# Patient Record
Sex: MALE | Race: OTHER | NOT HISPANIC OR LATINO | URBAN - METROPOLITAN AREA
[De-identification: names, ages, dates, MRNs, and addresses within clinical notes are randomized per-mention and may not be internally consistent; named-entity substitution may affect disease eponyms.]

---

## 2022-08-16 ENCOUNTER — EMERGENCY (EMERGENCY)
Facility: HOSPITAL | Age: 47
LOS: 1 days | Discharge: HOME | End: 2022-08-16
Admitting: STUDENT IN AN ORGANIZED HEALTH CARE EDUCATION/TRAINING PROGRAM
Payer: MEDICAID

## 2022-08-16 VITALS
HEIGHT: 66 IN | OXYGEN SATURATION: 98 % | SYSTOLIC BLOOD PRESSURE: 142 MMHG | RESPIRATION RATE: 18 BRPM | HEART RATE: 92 BPM | DIASTOLIC BLOOD PRESSURE: 91 MMHG | WEIGHT: 179.9 LBS | TEMPERATURE: 99 F

## 2022-08-16 DIAGNOSIS — K40.90 UNILATERAL INGUINAL HERNIA, WITHOUT OBSTRUCTION OR GANGRENE, NOT SPECIFIED AS RECURRENT: Chronic | ICD-10-CM

## 2022-08-16 PROCEDURE — 96372 THER/PROPH/DIAG INJ SC/IM: CPT

## 2022-08-16 PROCEDURE — 99284 EMERGENCY DEPT VISIT MOD MDM: CPT

## 2022-08-16 PROCEDURE — 99283 EMERGENCY DEPT VISIT LOW MDM: CPT | Mod: 25

## 2022-08-16 RX ORDER — CYCLOBENZAPRINE HYDROCHLORIDE 10 MG/1
1 TABLET, FILM COATED ORAL
Qty: 15 | Refills: 0
Start: 2022-08-16

## 2022-08-16 RX ORDER — CYCLOBENZAPRINE HYDROCHLORIDE 10 MG/1
10 TABLET, FILM COATED ORAL ONCE
Refills: 0 | Status: COMPLETED | OUTPATIENT
Start: 2022-08-16 | End: 2022-08-16

## 2022-08-16 RX ORDER — IBUPROFEN 200 MG
1 TABLET ORAL
Qty: 15 | Refills: 0
Start: 2022-08-16

## 2022-08-16 RX ORDER — LIDOCAINE 4 G/100G
1 CREAM TOPICAL ONCE
Refills: 0 | Status: COMPLETED | OUTPATIENT
Start: 2022-08-16 | End: 2022-08-16

## 2022-08-16 RX ORDER — KETOROLAC TROMETHAMINE 30 MG/ML
15 SYRINGE (ML) INJECTION ONCE
Refills: 0 | Status: DISCONTINUED | OUTPATIENT
Start: 2022-08-16 | End: 2022-08-16

## 2022-08-16 RX ADMIN — CYCLOBENZAPRINE HYDROCHLORIDE 10 MILLIGRAM(S): 10 TABLET, FILM COATED ORAL at 19:58

## 2022-08-16 RX ADMIN — Medication 15 MILLIGRAM(S): at 19:58

## 2022-08-16 RX ADMIN — LIDOCAINE 1 PATCH: 4 CREAM TOPICAL at 20:01

## 2022-08-16 NOTE — ED ADULT NURSE NOTE - SUICIDE SCREENING DEPRESSION
Continue same coumadin dose. recheck in 1 month
Patient wife and daughter informed and appt scheduled.
Negative

## 2022-08-16 NOTE — ED ADULT TRIAGE NOTE - ISOLATION TYPE:
Quality 111:Pneumonia Vaccination Status For Older Adults: Pneumococcal Vaccination not Administered or Previously Received, Reason not Otherwise Specified Detail Level: Generalized Quality 110: Preventive Care And Screening: Influenza Immunization: Influenza Immunization not Administered for Documented Reasons. Quality 431: Preventive Care And Screening: Unhealthy Alcohol Use - Screening: Patient screened for unhealthy alcohol use using a single question and scores less than 2 times per year Quality 130: Documentation Of Current Medications In The Medical Record: Current Medications Documented None

## 2022-08-16 NOTE — ED PROVIDER NOTE - CLINICAL SUMMARY MEDICAL DECISION MAKING FREE TEXT BOX
47 M denies pmh p/w L lower back pain radiating down LLE x 3 days, no trauma, no red flag sxs.  on exam pt vss, Back: no midline ttp/step off, + ttp over L lumbar/gluteal region, + L SLR, CHAVIRA and NVI.  consistent w/ sciatica pain.  given NSAID/msk relaxer and lido patch with improvement.  will dc w/ rxs and have f/u with pmd.  discussed strict return parameters

## 2022-08-16 NOTE — ED PROVIDER NOTE - NSFOLLOWUPINSTRUCTIONS_ED_ALL_ED_FT
Take tylenol 650mg or motrin 600mg for pain every 4-6 hours.  You can also take flexeril (muscle relaxer) as prescribed for pain but do not drive/operate heavy machinery as it can make you drowsy    Call to arrange follow up with primary care doctor within one week       Sciatica       Sciatica is pain, numbness, weakness, or tingling along the path of the sciatic nerve. The sciatic nerve starts in the lower back and runs down the back of each leg. The nerve controls the muscles in the lower leg and in the back of the knee. It also provides feeling (sensation) to the back of the thigh, the lower leg, and the sole of the foot. Sciatica is a symptom of another medical condition that pinches or puts pressure on the sciatic nerve.    Sciatica most often only affects one side of the body. Sciatica usually goes away on its own or with treatment. In some cases, sciatica may come back (recur).      What are the causes?    This condition is caused by pressure on the sciatic nerve or pinching of the nerve. This may be the result of:  •A disk in between the bones of the spine bulging out too far (herniated disk).      •Age-related changes in the spinal disks.      •A pain disorder that affects a muscle in the buttock.      •Extra bone growth near the sciatic nerve.      •A break (fracture) of the pelvis.      •Pregnancy.      •Tumor. This is rare.        What increases the risk?    The following factors may make you more likely to develop this condition:  •Playing sports that place pressure or stress on the spine.      •Having poor strength and flexibility.      •A history of back injury or surgery.      •Sitting for long periods of time.      •Doing activities that involve repetitive bending or lifting.      •Obesity.        What are the signs or symptoms?    Symptoms can vary from mild to very severe, and they may include:•Any of these problems in the lower back, leg, hip, or buttock:  •Mild tingling, numbness, or dull aches.      •Burning sensations.      •Sharp pains.        •Numbness in the back of the calf or the sole of the foot.      •Leg weakness.      •Severe back pain that makes movement difficult.      Symptoms may get worse when you cough, sneeze, or laugh, or when you sit or stand for long periods of time.      How is this diagnosed?    This condition may be diagnosed based on:  •Your symptoms and medical history.      •A physical exam.      •Blood tests.    •Imaging tests, such as:  •X-rays.      •MRI.      •CT scan.          How is this treated?    In many cases, this condition improves on its own without treatment. However, treatment may include:  •Reducing or modifying physical activity.      •Exercising and stretching.      •Icing and applying heat to the affected area.    •Medicines that help to:  •Relieve pain and swelling.      •Relax your muscles.        •Injections of medicines that help to relieve pain, irritation, and inflammation around the sciatic nerve (steroids).      •Surgery.        Follow these instructions at home:    Medicines     •Take over-the-counter and prescription medicines only as told by your health care provider.    •Ask your health care provider if the medicine prescribed to you:  •Requires you to avoid driving or using heavy machinery.    •Can cause constipation. You may need to take these actions to prevent or treat constipation:  •Drink enough fluid to keep your urine pale yellow.      •Take over-the-counter or prescription medicines.      •Eat foods that are high in fiber, such as beans, whole grains, and fresh fruits and vegetables.      •Limit foods that are high in fat and processed sugars, such as fried or sweet foods.            Managing pain                 •If directed, put ice on the affected area.  •Put ice in a plastic bag.      •Place a towel between your skin and the bag.      •Leave the ice on for 20 minutes, 2–3 times a day.      •If directed, apply heat to the affected area. Use the heat source that your health care provider recommends, such as a moist heat pack or a heating pad.  •Place a towel between your skin and the heat source.      •Leave the heat on for 20–30 minutes.      •Remove the heat if your skin turns bright red. This is especially important if you are unable to feel pain, heat, or cold. You may have a greater risk of getting burned.          Activity      •Return to your normal activities as told by your health care provider. Ask your health care provider what activities are safe for you.      •Avoid activities that make your symptoms worse.    •Take brief periods of rest throughout the day.  •When you rest for longer periods, mix in some mild activity or stretching between periods of rest. This will help to prevent stiffness and pain.      •Avoid sitting for long periods of time without moving. Get up and move around at least one time each hour.        •Exercise and stretch regularly, as told by your health care provider.      • Do not lift anything that is heavier than 10 lb (4.5 kg) while you have symptoms of sciatica. When you do not have symptoms, you should still avoid heavy lifting, especially repetitive heavy lifting.    •When you lift objects, always use proper lifting technique, which includes:  •Bending your knees.      •Keeping the load close to your body.      •Avoiding twisting.        General instructions     •Maintain a healthy weight. Excess weight puts extra stress on your back.      •Wear supportive, comfortable shoes. Avoid wearing high heels.      •Avoid sleeping on a mattress that is too soft or too hard. A mattress that is firm enough to support your back when you sleep may help to reduce your pain.      •Keep all follow-up visits as told by your health care provider. This is important.        Contact a health care provider if:  •You have pain that:  •Wakes you up when you are sleeping.      •Gets worse when you lie down.      •Is worse than you have experienced in the past.      •Lasts longer than 4 weeks.        •You have an unexplained weight loss.        Get help right away if:    •You are not able to control when you urinate or have bowel movements (incontinence).    •You have:  •Weakness in your lower back, pelvis, buttocks, or legs that gets worse.      •Redness or swelling of your back.      •A burning sensation when you urinate.          Summary    •Sciatica is pain, numbness, weakness, or tingling along the path of the sciatic nerve.      •This condition is caused by pressure on the sciatic nerve or pinching of the nerve.      •Sciatica can cause pain, numbness, or tingling in the lower back, legs, hips, and buttocks.      •Treatment often includes rest, exercise, medicines, and applying ice or heat.      This information is not intended to replace advice given to you by your health care provider. Make sure you discuss any questions you have with your health care provider.

## 2022-08-16 NOTE — ED ADULT NURSE NOTE - OBJECTIVE STATEMENT
Patient /co of left hip pain x 2 -3 days, denies any fall or injury states mild relief w/ Advil.  Able to bear weight and ambulate w/out difficulty, no obvious deformity.

## 2022-08-16 NOTE — ED PROVIDER NOTE - OBJECTIVE STATEMENT
47 M denies pmh p/w L lower back pain radiating down LLE x 3 days.  pt reports constant dull aching pain in L lower radiates down lateral/posterior L leg worse w/ movement.  taking motrin w/ improvement but returns when meds wear off.  denies any injuries or heavy lifting.  Denies fever, numbness, weakness, difficulty walking, bowel/bladder dysfunction, dysuria, hematuria, saddle anesthesia, h/o CA, h/o IVDA, fall/trauma

## 2022-08-16 NOTE — ED PROVIDER NOTE - PATIENT PORTAL LINK FT
You can access the FollowMyHealth Patient Portal offered by [facility] by registering at the following website: [email or web address]. By joining [facility]’s FollowMyHealth portal, you will also be able to view your health information using other applications (apps) compatible with our system.

## 2022-08-16 NOTE — ED PROVIDER NOTE - PHYSICAL EXAMINATION
Vitals reviewed  Gen: well appearing, nad, speaking in full sentences  Skin: wwp, no rash/lesions  HEENT: ncat, eomi, mmm  Back: no midline ttp/step off, + ttp over L lumbar/gluteal region, + L SLR   CV: rrr, no audible m/r/g  Resp: symmetrical expansion, ctab, no w/r/r  Abd: nondistended, soft/nt  Ext: FROM throughout, no peripheral edema, SILT, distal pulses 2+, 5/5 strength   Neuro: alert/oriented, no focal deficits, steady gait w/o assistance

## 2022-08-18 DIAGNOSIS — M54.50 LOW BACK PAIN, UNSPECIFIED: ICD-10-CM

## 2022-08-19 ENCOUNTER — EMERGENCY (EMERGENCY)
Facility: HOSPITAL | Age: 47
LOS: 1 days | Discharge: HOME | End: 2022-08-19
Admitting: EMERGENCY MEDICINE
Payer: MEDICAID

## 2022-08-19 VITALS
RESPIRATION RATE: 18 BRPM | OXYGEN SATURATION: 99 % | DIASTOLIC BLOOD PRESSURE: 88 MMHG | TEMPERATURE: 99 F | SYSTOLIC BLOOD PRESSURE: 142 MMHG | HEIGHT: 66 IN | HEART RATE: 100 BPM | WEIGHT: 179.9 LBS

## 2022-08-19 DIAGNOSIS — K40.90 UNILATERAL INGUINAL HERNIA, WITHOUT OBSTRUCTION OR GANGRENE, NOT SPECIFIED AS RECURRENT: Chronic | ICD-10-CM

## 2022-08-19 PROBLEM — Z78.9 OTHER SPECIFIED HEALTH STATUS: Chronic | Status: ACTIVE | Noted: 2022-08-16

## 2022-08-19 PROCEDURE — 99283 EMERGENCY DEPT VISIT LOW MDM: CPT

## 2022-08-19 PROCEDURE — 99284 EMERGENCY DEPT VISIT MOD MDM: CPT

## 2022-08-19 RX ORDER — TRAMADOL HYDROCHLORIDE 50 MG/1
1 TABLET ORAL
Qty: 12 | Refills: 0
Start: 2022-08-19

## 2022-08-19 NOTE — ED ADULT NURSE NOTE - OBJECTIVE STATEMENT
Patient reporting left low back pain - radiating down the left leg; denies any recent injury.  Patient states he was seen in ED 2 days ago for similar complaint "They gave me medication, but it isn't working."

## 2022-08-19 NOTE — ED PROVIDER NOTE - NSFOLLOWUPINSTRUCTIONS_ED_ALL_ED_FT
Please follow up with your primary doctor, possibly needing physical therapy, pain management, orthopedic referral.    Acute Low Back Pain    WHAT YOU NEED TO KNOW:    Acute low back pain is sudden discomfort that lasts up to 6 weeks and makes activity difficult.    DISCHARGE INSTRUCTIONS:    Return to the emergency department if:   •You have severe pain.      •You have sudden stiffness and heaviness on both buttocks down to both legs.      •You have numbness or weakness in one leg, or pain in both legs.      •You have numbness in your genital area or across your lower back.      •You cannot control your urine or bowel movements.      Call your doctor if:   •You have a fever.      •You have pain at night or when you rest.      •Your pain does not get better with treatment.      •You have pain that worsens when you cough or sneeze.      •You suddenly feel something pop or snap in your back.      •You have questions or concerns about your condition or care.      Medicines: You may need any of the following:  •NSAIDs, such as ibuprofen, help decrease swelling, pain, and fever. This medicine is available with or without a doctor's order. NSAIDs can cause stomach bleeding or kidney problems in certain people. If you take blood thinner medicine, always ask your healthcare provider if NSAIDs are safe for you. Always read the medicine label and follow directions.      •Acetaminophen decreases pain and fever. It is available without a doctor's order. Ask how much to take and how often to take it. Follow directions. Read the labels of all other medicines you are using to see if they also contain acetaminophen, or ask your doctor or pharmacist. Acetaminophen can cause liver damage if not taken correctly.      •Muscle relaxers decrease pain by relaxing the muscles in your lower spine.      •Prescription pain medicine may be given. Ask your healthcare provider how to take this medicine safely. Some prescription pain medicines contain acetaminophen. Do not take other medicines that contain acetaminophen without talking to your healthcare provider. Too much acetaminophen may cause liver damage. Prescription pain medicine may cause constipation. Ask your healthcare provider how to prevent or treat constipation.       Self-care:   •Stay active as much as you can without causing more pain. Bed rest could make your back pain worse. Start with some light exercises, such as walking. Avoid heavy lifting until your pain is gone. Ask for more information about the activities or exercises that are right for you.   Family Walking for Exercise           •Apply heat on your back for 20 to 30 minutes every 2 hours for as many days as directed. Heat helps decrease pain and muscle spasms. Alternate heat and ice.      •Apply ice on your back for 15 to 20 minutes every hour or as directed. Use an ice pack, or put crushed ice in a plastic bag. Cover it with a towel before you apply it to your skin. Ice helps prevent tissue damage and decreases swelling and pain.      Prevent acute low back pain:   •Use proper body mechanics. ?Bend at the hips and knees when you  objects. Do not bend from the waist. Use your leg muscles as you lift the load. Do not use your back. Keep the object close to your chest as you lift it. Try not to twist or lift anything above your waist.  How to Lift Items Safely           ?Change your position often when you stand for long periods of time. Rest one foot on a small box or footrest, and then switch to the other foot often.      ?Try not to sit for long periods of time. When you do, sit in a straight-backed chair with your feet flat on the floor. Never reach, pull, or push while you are sitting.      •Do exercises that strengthen your back muscles. Warm up before you exercise. Ask your healthcare provider the best exercises for you.  Warm up and Cool Down            •Maintain a healthy weight. Ask your healthcare provider what a healthy weight is for you. Ask him or her to help you create a weight loss plan if you are overweight.      Follow up with your doctor as directed: Return for a follow-up visit if you still have pain after 1 to 3 weeks of treatment. You may need to visit an orthopedist if your back pain lasts longer than 12 weeks. Write down your questions so you remember to ask them during your visits.

## 2022-08-19 NOTE — ED ADULT TRIAGE NOTE - CHIEF COMPLAINT QUOTE
pt c/o right lower back, hip and leg pain x 1 week. also intermittent tingling and numbness to the leg. stated " he was here before and they gave him medicine but is not working" denies injuries,

## 2022-08-19 NOTE — ED PROVIDER NOTE - MUSCULOSKELETAL, MLM
no midline spine tenderness, +mild left lumbar tenderness with no overlying crepitus or deformity, no ecchymosis, no swelling

## 2022-08-19 NOTE — ED PROVIDER NOTE - PATIENT PORTAL LINK FT
You can access the FollowMyHealth Patient Portal offered by [facility] by registering at the following website: [email or web address]. By joining [employer]’s FollowMyHealth portal, you will also be able to view your health information using other applications (apps) compatible with our system.

## 2022-08-19 NOTE — ED ADULT NURSE NOTE - NSICDXPASTMEDICALHX_GEN_ALL_CORE_FT
PAST MEDICAL HISTORY:  HLD (hyperlipidemia)     HTN (hypertension)     Inguinal hernia     No pertinent past medical history

## 2022-08-19 NOTE — ED PROVIDER NOTE - OBJECTIVE STATEMENT
48 y/o m presents c/o left low back pain radiating to left leg x 1 week.  Pt was in ED 3 days ago, given flexeril and ibuprofen which he states hasn't helped, hasn't taken anything today.  Pt reports "tingling" to left calf but reports it is not numb, has been ambulatory.  Pt denies weakness, saddle anesthesia, bowel/bladder incontinence, all other ROS negative.

## 2022-08-19 NOTE — ED PROVIDER NOTE - CLINICAL SUMMARY MEDICAL DECISION MAKING FREE TEXT BOX
46 y/o m presents c/o left low back pain radiating to left leg; no neuro deficits, pt ambulatory, likely sciatica.  Will continue ibuprofen and flexeril, given tramadol for breakthrough pain, not concerned for cord compression, no indication for imaging at this time.  Will d/c, to f/u with pmd for further eval, possible PT, possible ortho spine referral, MRI if pain persists.

## 2022-08-19 NOTE — ED ADULT NURSE NOTE - NSICDXPASTSURGICALHX_GEN_ALL_CORE_FT
PAST SURGICAL HISTORY:  Inguinal hernia patient is unsure of history but remembers a groin surgery for a bulge on his R when he was 5 years old

## 2022-08-21 DIAGNOSIS — F17.200 NICOTINE DEPENDENCE, UNSPECIFIED, UNCOMPLICATED: ICD-10-CM

## 2022-08-21 DIAGNOSIS — M54.50 LOW BACK PAIN, UNSPECIFIED: ICD-10-CM

## 2022-08-21 DIAGNOSIS — I10 ESSENTIAL (PRIMARY) HYPERTENSION: ICD-10-CM

## 2022-08-21 DIAGNOSIS — E78.5 HYPERLIPIDEMIA, UNSPECIFIED: ICD-10-CM

## 2023-12-04 NOTE — ED PROVIDER NOTE - IV ALTEPLASE DOOR HIDDEN
BIBEMS from [facility] for rehab for sudden SOB. Per EMS pt. SpO2 on room air was low 90s. Former smoker. Symptoms started ~1 hour prior to arrival. show BIBEMS from [facility] for rehab for sudden SOB. Per EMS pt. SpO2 on room air was low 90s. Former smoker. Symptoms started ~1 hour prior to arrival.

## 2024-08-31 ENCOUNTER — INPATIENT (INPATIENT)
Facility: HOSPITAL | Age: 49
LOS: 1 days | Discharge: HOME | DRG: 282 | End: 2024-09-02
Attending: HOSPITALIST | Admitting: HOSPITALIST
Payer: MEDICAID

## 2024-08-31 VITALS
DIASTOLIC BLOOD PRESSURE: 85 MMHG | TEMPERATURE: 99 F | SYSTOLIC BLOOD PRESSURE: 136 MMHG | RESPIRATION RATE: 18 BRPM | WEIGHT: 202.83 LBS | OXYGEN SATURATION: 96 % | HEART RATE: 78 BPM | HEIGHT: 70 IN

## 2024-08-31 DIAGNOSIS — K40.90 UNILATERAL INGUINAL HERNIA, WITHOUT OBSTRUCTION OR GANGRENE, NOT SPECIFIED AS RECURRENT: Chronic | ICD-10-CM

## 2024-08-31 DIAGNOSIS — I21.4 NON-ST ELEVATION (NSTEMI) MYOCARDIAL INFARCTION: ICD-10-CM

## 2024-08-31 LAB
ALBUMIN SERPL ELPH-MCNC: 3.9 G/DL — SIGNIFICANT CHANGE UP (ref 3.3–5)
ALP SERPL-CCNC: 42 U/L — SIGNIFICANT CHANGE UP (ref 40–120)
ALT FLD-CCNC: 28 U/L — SIGNIFICANT CHANGE UP (ref 10–45)
ANION GAP SERPL CALC-SCNC: 12 MMOL/L — SIGNIFICANT CHANGE UP (ref 5–17)
APTT BLD: 71 SEC — HIGH (ref 24.5–35.6)
APTT BLD: 73.1 SEC — HIGH (ref 24.5–35.6)
AST SERPL-CCNC: 25 U/L — SIGNIFICANT CHANGE UP (ref 10–40)
BASOPHILS # BLD AUTO: 0.04 K/UL — SIGNIFICANT CHANGE UP (ref 0–0.2)
BASOPHILS NFR BLD AUTO: 0.5 % — SIGNIFICANT CHANGE UP (ref 0–2)
BILIRUB SERPL-MCNC: 0.7 MG/DL — SIGNIFICANT CHANGE UP (ref 0.2–1.2)
BUN SERPL-MCNC: 12 MG/DL — SIGNIFICANT CHANGE UP (ref 7–23)
CALCIUM SERPL-MCNC: 9.1 MG/DL — SIGNIFICANT CHANGE UP (ref 8.4–10.5)
CHLORIDE SERPL-SCNC: 106 MMOL/L — SIGNIFICANT CHANGE UP (ref 96–108)
CO2 SERPL-SCNC: 23 MMOL/L — SIGNIFICANT CHANGE UP (ref 22–31)
CREAT SERPL-MCNC: 0.96 MG/DL — SIGNIFICANT CHANGE UP (ref 0.5–1.3)
EGFR: 96 ML/MIN/1.73M2 — SIGNIFICANT CHANGE UP
EOSINOPHIL # BLD AUTO: 0.16 K/UL — SIGNIFICANT CHANGE UP (ref 0–0.5)
EOSINOPHIL NFR BLD AUTO: 2 % — SIGNIFICANT CHANGE UP (ref 0–6)
GLUCOSE SERPL-MCNC: 87 MG/DL — SIGNIFICANT CHANGE UP (ref 70–99)
HCT VFR BLD CALC: 39.4 % — SIGNIFICANT CHANGE UP (ref 39–50)
HGB BLD-MCNC: 13.4 G/DL — SIGNIFICANT CHANGE UP (ref 13–17)
IMM GRANULOCYTES NFR BLD AUTO: 0.4 % — SIGNIFICANT CHANGE UP (ref 0–0.9)
INR BLD: 1.03 RATIO — SIGNIFICANT CHANGE UP (ref 0.85–1.18)
INR BLD: 1.04 RATIO — SIGNIFICANT CHANGE UP (ref 0.85–1.18)
LYMPHOCYTES # BLD AUTO: 2.82 K/UL — SIGNIFICANT CHANGE UP (ref 1–3.3)
LYMPHOCYTES # BLD AUTO: 34.6 % — SIGNIFICANT CHANGE UP (ref 13–44)
MAGNESIUM SERPL-MCNC: 2 MG/DL — SIGNIFICANT CHANGE UP (ref 1.6–2.6)
MCHC RBC-ENTMCNC: 28.8 PG — SIGNIFICANT CHANGE UP (ref 27–34)
MCHC RBC-ENTMCNC: 34 GM/DL — SIGNIFICANT CHANGE UP (ref 32–36)
MCV RBC AUTO: 84.5 FL — SIGNIFICANT CHANGE UP (ref 80–100)
MONOCYTES # BLD AUTO: 0.85 K/UL — SIGNIFICANT CHANGE UP (ref 0–0.9)
MONOCYTES NFR BLD AUTO: 10.4 % — SIGNIFICANT CHANGE UP (ref 2–14)
NEUTROPHILS # BLD AUTO: 4.25 K/UL — SIGNIFICANT CHANGE UP (ref 1.8–7.4)
NEUTROPHILS NFR BLD AUTO: 52.1 % — SIGNIFICANT CHANGE UP (ref 43–77)
NRBC # BLD: 0 /100 WBCS — SIGNIFICANT CHANGE UP (ref 0–0)
NT-PROBNP SERPL-SCNC: 114 PG/ML — SIGNIFICANT CHANGE UP (ref 0–300)
PLATELET # BLD AUTO: 291 K/UL — SIGNIFICANT CHANGE UP (ref 150–400)
POTASSIUM SERPL-MCNC: 3.8 MMOL/L — SIGNIFICANT CHANGE UP (ref 3.5–5.3)
POTASSIUM SERPL-SCNC: 3.8 MMOL/L — SIGNIFICANT CHANGE UP (ref 3.5–5.3)
PROT SERPL-MCNC: 6.7 G/DL — SIGNIFICANT CHANGE UP (ref 6–8.3)
PROTHROM AB SERPL-ACNC: 11.3 SEC — SIGNIFICANT CHANGE UP (ref 9.5–13)
PROTHROM AB SERPL-ACNC: 11.4 SEC — SIGNIFICANT CHANGE UP (ref 9.5–13)
RBC # BLD: 4.66 M/UL — SIGNIFICANT CHANGE UP (ref 4.2–5.8)
RBC # FLD: 13.4 % — SIGNIFICANT CHANGE UP (ref 10.3–14.5)
SODIUM SERPL-SCNC: 141 MMOL/L — SIGNIFICANT CHANGE UP (ref 135–145)
TROPONIN T, HIGH SENSITIVITY RESULT: 42 NG/L — SIGNIFICANT CHANGE UP (ref 0–51)
WBC # BLD: 8.15 K/UL — SIGNIFICANT CHANGE UP (ref 3.8–10.5)
WBC # FLD AUTO: 8.15 K/UL — SIGNIFICANT CHANGE UP (ref 3.8–10.5)

## 2024-08-31 PROCEDURE — 99285 EMERGENCY DEPT VISIT HI MDM: CPT

## 2024-08-31 PROCEDURE — 99223 1ST HOSP IP/OBS HIGH 75: CPT

## 2024-08-31 PROCEDURE — 93010 ELECTROCARDIOGRAM REPORT: CPT

## 2024-08-31 PROCEDURE — 71045 X-RAY EXAM CHEST 1 VIEW: CPT | Mod: 26

## 2024-08-31 RX ORDER — HEPARIN SODIUM,BOVINE 1000/ML
VIAL (ML) INJECTION
Qty: 25000 | Refills: 0 | Status: DISCONTINUED | OUTPATIENT
Start: 2024-08-31 | End: 2024-09-01

## 2024-08-31 RX ORDER — HEPARIN SODIUM,BOVINE 1000/ML
6000 VIAL (ML) INJECTION EVERY 6 HOURS
Refills: 0 | Status: DISCONTINUED | OUTPATIENT
Start: 2024-08-31 | End: 2024-09-01

## 2024-08-31 RX ADMIN — Medication 1000 UNIT(S)/HR: at 20:01

## 2024-08-31 NOTE — ED ADULT TRIAGE NOTE - CHIEF COMPLAINT QUOTE
cp previously, denies any complaints in the ed; transfer from [facility]; nstemi on heparin

## 2024-08-31 NOTE — H&P ADULT - ASSESSMENT
48yo M w/ PMH of HTN, HLD, DM2, active smoker pw CP and SOB to OSH, tx to [facility] for NSTEMI vs unstable angina as trop rising but plateau here in indeterminant range

## 2024-08-31 NOTE — ED ADULT NURSE NOTE - NSFALLUNIVINTERV_ED_ALL_ED
Bed/Stretcher in lowest position, wheels locked, appropriate side rails in place/Call bell, personal items and telephone in reach/Instruct patient to call for assistance before getting out of bed/chair/stretcher/Non-slip footwear applied when patient is off stretcher/[place] to call system/Physically safe environment - no spills, clutter or unnecessary equipment/Purposeful proactive rounding/Room/bathroom lighting operational, light cord in reach

## 2024-08-31 NOTE — ED ADULT NURSE NOTE - CHIEF COMPLAINT QUOTE
cp previously, denies any complaints in the ed; transfer from [facility]; nstemi on heparin

## 2024-08-31 NOTE — ED ADULT TRIAGE NOTE - WEIGHT IN KG
[FreeTextEntry1] : General: In no apparent distress. \par HEENT:  Atraumatic.  Extraocular muscles intact.  \par Cardiovascular: Regular rate and rhythm, normal S1/S2 \par Pulmonary: Clear to auscultation bilaterally.  No wheezing. \par Abdomen: soft, gravid, nontender, nondistended, no rebound, no guarding \par Extremities: no calf tenderness or edema \par Neurology: +2 reflexes in bilateral upper extremities \par Psychiatry:  Happy mood.  Awake, alert, oriented to person, place, time.
92

## 2024-08-31 NOTE — ED ADULT NURSE NOTE - OBJECTIVE STATEMENT
48y/o Male BIBEMS transfer from [facility] with NSTEMI on Heparin drip 1000u/hr. As per EMS Heparin drip initiated at 1702, bolus of 4000u administered at approx 1642hrs. Pt states waking up at approx 0400hrs with sudden onset chest pain, went to [facility] ED at 0700hrs. PMHx HTN, HLD, t2DM, on ASA. Pt is AxOx4. Pt presents with two IV catheters in place b/l ACs. Pt airway is patent, breathing is shallow and unlabored. Pt skin is warm, dry, color appropriate for ethnicity. MD Nagy at bedside. Pt weighed on standing scale. Blood drawn and sent to lab. Heparin continued at 1000u/hr as per MD orders. VS WDL see flow sheet. Stretcher locked and in lowest position, appropriate side rails up. Pt instructed to notify RN if assistance is needed.

## 2024-08-31 NOTE — ED PROVIDER NOTE - CLINICAL SUMMARY MEDICAL DECISION MAKING FREE TEXT BOX
49-year-old male past medical history of diabetes, hypertension, hypercholesterolemia, senting with left-sided chest pain accompanied with shortness of breath started 4 AM onwards. VSS. Tx from [facility] for NSTEMI Trop 11->18->35, Cardiology at bedside, patient on Heparin AC, plan to [facility] tomorrow. Pending labs, will admit.

## 2024-08-31 NOTE — ED PROVIDER NOTE - ATTENDING CONTRIBUTION TO CARE
Fatmata Nagy DO. EM Attending Physician.    49-year-old male past medical history of diabetes, hypertension, hypercholesterolemia, senting with left-sided chest pain accompanied with shortness of breath started 4 AM onwards.  Chest pain description pressure-like, constant, moderate in intensity.  Shortness of breath occurring at rest.  Patient visited [facility] at which time was identified having a non-ST elevation MI, started on heparin anticoagulation and transferred to [facility] for cardiology consult.  Upon presentation patient reports no symptoms at this time.  Denies fevers, chills, sore throat/runny nose, neck stiffness, palpitations, vomitings, abdominal pain, urinary/bowel complaints.    PHYSICAL EXAM:  CONSTITUTIONAL: Nontoxic appearing, awake, alert, oriented to person place situation  HEAD: Atraumatic, no step offs.  NECK: Supple, no meningismus.   EYES: Clear bilaterally.   ENMT: Airway patent, Mouth with normal mucosa. Uvula is midline.   CARDIAC: Regular rate, regular rhythm.  RESPIRATORY: Breathing unlabored. Breath sounds clear and equal bilaterally.  ABDOMEN:  Soft, nontender, nondistended. No rebound tenderness or guarding.  FLANK: No CVA tenderness bilaterally.  NEUROLOGICAL: Alert and oriented, no focal deficits, no motor or sensory deficits.   MSK: No clubbing, cyanosis, or edema. .  SKIN: Skin warm and dry.     MDM  Concerning for ACS/NSTEMI, will evaluate labs, continue acs anticoagulation, appreciate cardiology recommendations and admit       Attending Contribution to Care:  I performed a history and physical exam of the patient and discussed their management with the resident, student, and/or advanced care provider. I reviewed the resident, student, and/or advanced care provider's note and agree with the documented findings and plan of care. I was present and available for all procedures.

## 2024-08-31 NOTE — H&P ADULT - NSHPPHYSICALEXAM_GEN_ALL_CORE
Vital Signs Last 24 Hrs  T(C): 36.8 (31 Aug 2024 23:35), Max: 37.2 (31 Aug 2024 19:28)  T(F): 98.2 (31 Aug 2024 23:35), Max: 98.9 (31 Aug 2024 19:28)  HR: 68 (31 Aug 2024 23:35) (68 - 78)  BP: 149/91 (31 Aug 2024 23:35) (136/85 - 149/91)  BP(mean): 112 (31 Aug 2024 23:03) (112 - 114)  RR: 20 (31 Aug 2024 23:35) (18 - 24)  SpO2: 98% (31 Aug 2024 23:35) (96% - 98%)    Parameters below as of 31 Aug 2024 23:35  Patient On (Oxygen Delivery Method): room air    CONSTITUTIONAL: Well-groomed, in no apparent distress  EYES: No conjunctival or scleral injection, non-icteric;   ENMT: No external nasal lesions; MMM  NECK: Trachea midline without palpable neck mass; thyroid not enlarged and non-tender  RESPIRATORY: Breathing comfortably; no dullness to percussion; lungs CTA without wheeze/rhonchi/rales  CARDIOVASCULAR: +S1S2, RRR, no M/G/R; pedal pulses full and symmetric; no lower extremity edema  GASTROINTESTINAL: No palpable masses or tenderness, +BS throughout, no rebound/guarding; no hepatosplenomegaly; no hernia palpated  LYMPHATIC: No cervical LAD or tenderness  SKIN: No rashes or ulcers noted  NEUROLOGIC: CN II-XII intact; sensation intact in LEs b/l to light touch  PSYCHIATRIC: A&Ox3; mood and affect appropriate; appropriate insight and judgment

## 2024-08-31 NOTE — ED PROVIDER NOTE - OBJECTIVE STATEMENT
49-year-old male past medical history of diabetes, hypertension, hypercholesterolemia, senting with left-sided chest pain accompanied with shortness of breath started 4 AM onwards.  Chest pain description pressure-like, constant, moderate in intensity.  Shortness of breath occurring at rest.  Patient visited [facility] at which time was identified having a non-ST elevation MI, started on heparin anticoagulation and transferred to [facility] for cardiology consult.  Upon presentation patient reports no symptoms at this time.  Denies fevers, chills, sore throat/runny nose, neck stiffness, palpitations, vomitings, abdominal pain, urinary/bowel complaints.
Stable

## 2024-08-31 NOTE — H&P ADULT - PROBLEM SELECTOR PLAN 1
- Typical CP w/ rising trop at OSH now CP free  - s/p ASA/Brilinta load and started on hep gtt  - c/w hep gtt for ACS  - c/w ASA 81mg qd and Brilinta 90mg BID  - If CP or SOB ON, STAT ECG and cardiac enzymes  - Evaluated by cards, plan for LHC in AM

## 2024-08-31 NOTE — H&P ADULT - NSHPLABSRESULTS_GEN_ALL_CORE
LABS:                      13.4   8.15  )-----------( 291      ( 31 Aug 2024 20:09 )             39.4     08-31    141  |  106  |  12  ----------------------------<  87  3.8   |  23  |  0.96    Ca    9.1      31 Aug 2024 20:09  Mg     2.0     08-31    TPro  6.7  /  Alb  3.9  /  TBili  0.7  /  DBili  x   /  AST  25  /  ALT  28  /  AlkPhos  42  08-31    CARDIAC MARKERS ( 31 Aug 2024 20:09 )  x     / x     / x     / x     / 10.6 ng/mL    LIVER FUNCTIONS - ( 31 Aug 2024 20:09 )  Alb: 3.9 g/dL / Pro: 6.7 g/dL / ALK PHOS: 42 U/L / ALT: 28 U/L / AST: 25 U/L / GGT: x           PT/INR - ( 31 Aug 2024 23:35 )   PT: 11.3 sec;   INR: 1.03 ratio    PTT - ( 31 Aug 2024 23:35 )  PTT:71.0 sec    Urinalysis Basic - ( 31 Aug 2024 20:09 )  Color: x / Appearance: x / SG: x / pH: x  Gluc: 87 mg/dL / Ketone: x  / Bili: x / Urobili: x   Blood: x / Protein: x / Nitrite: x   Leuk Esterase: x / RBC: x / WBC x   Sq Epi: x / Non Sq Epi: x / Bacteria: x    IMAGING:  Xray Chest 1 View- PORTABLE-Urgent (08.31.24 @ 20:39)  IMPRESSION:  - No focal consolidations.  ******PRELIMINARY REPORT******

## 2024-08-31 NOTE — H&P ADULT - NSCORESITESY/N_GEN_A_CORE_RD
Date of Service: 09/21/2017    Terrell Pizarro MD  [address]  [address] [address]    Dear Terrell:    I saw Bernadette back in office.  She is 6 weeks out from total hip replacement.  All in all is doing excellent and having absolutely no problems.  Is resuming her normal activities.      Terrell, thanks again for your trust in referring her to my care.      Dictated By: Ajith Weiner MD  Signing Provider: Ajith Weiner MD    MALISSA/SS1 ([id number])  DD: 09/21/2017 13:32:01 TD: 09/21/2017 13:45:16    Copy Sent To:     cc: Terrell Pizarro MD   No

## 2024-08-31 NOTE — ED ADULT TRIAGE NOTE - MEANS OF ARRIVAL
Bleeding that does not stop/Swelling that gets worse/Pain not relieved by Medications/Fever greater than (need to indicate Fahrenheit or Celsius)/Wound/Surgical Site with redness, or foul smelling discharge or pus/Numbness, tingling, color or temperature change to extremity/Nausea and vomiting that does not stop
stretcher

## 2024-08-31 NOTE — ED PROVIDER NOTE - CCCP TRG CHIEF CMPLNT
INR result is 1.2  INR   Date Value Ref Range Status   06/03/2019 1.30 (!) 0.9 - 1.1 Final       Will the patient be seen, or did they already see, MD or CNP today? Yes Dr. Rees on 6/10 at 12pm.    Most Recent Warfarin dose day/week  Sunday Monday Tuesday Wednesday Thursday Friday Saturday    10 7.5 7.5 5 7.5 Missed dose     Sunday Monday Tuesday Wednesday Thursday Friday Saturday   Missed dose             Has the patient missed any doses of Coumadin, Warfarin, Jantoven in the past 7 days? Yes Saturday (7.5mg) and Sunday dose (5mg)    Has the patients medications changed since the last visit? No    Has the patient experienced any bleeding recently? No    Has the patient experienced any injuries or illness recently? No    Has the patient experienced any 'new' shortness of breath, severe headaches, or changes in vision recently? No    Has the patient had any changes in their diet, or alcohol consumption? No    Is the patient here today to prepare for any type of upcoming surgery, procedure, or for a cardioversion procedure? No    What phone number can we reach the patient at today? [phone] [place] from [employer].   chest pain

## 2024-09-01 DIAGNOSIS — Z98.890 OTHER SPECIFIED POSTPROCEDURAL STATES: Chronic | ICD-10-CM

## 2024-09-01 DIAGNOSIS — Z29.9 ENCOUNTER FOR PROPHYLACTIC MEASURES, UNSPECIFIED: ICD-10-CM

## 2024-09-01 DIAGNOSIS — I21.4 NON-ST ELEVATION (NSTEMI) MYOCARDIAL INFARCTION: ICD-10-CM

## 2024-09-01 DIAGNOSIS — E11.9 TYPE 2 DIABETES MELLITUS WITHOUT COMPLICATIONS: ICD-10-CM

## 2024-09-01 DIAGNOSIS — Z79.899 OTHER LONG TERM (CURRENT) DRUG THERAPY: ICD-10-CM

## 2024-09-01 DIAGNOSIS — I10 ESSENTIAL (PRIMARY) HYPERTENSION: ICD-10-CM

## 2024-09-01 LAB
A1C WITH ESTIMATED AVERAGE GLUCOSE RESULT: 5.8 % — HIGH (ref 4–5.6)
ADD ON TEST-SPECIMEN IN LAB: SIGNIFICANT CHANGE UP
ANION GAP SERPL CALC-SCNC: 11 MMOL/L — SIGNIFICANT CHANGE UP (ref 5–17)
APTT BLD: 76.6 SEC — HIGH (ref 24.5–35.6)
BASOPHILS # BLD AUTO: 0.04 K/UL — SIGNIFICANT CHANGE UP (ref 0–0.2)
BASOPHILS NFR BLD AUTO: 0.6 % — SIGNIFICANT CHANGE UP (ref 0–2)
BUN SERPL-MCNC: 11 MG/DL — SIGNIFICANT CHANGE UP (ref 7–23)
CALCIUM SERPL-MCNC: 9.1 MG/DL — SIGNIFICANT CHANGE UP (ref 8.4–10.5)
CHLORIDE SERPL-SCNC: 107 MMOL/L — SIGNIFICANT CHANGE UP (ref 96–108)
CHOLEST SERPL-MCNC: 196 MG/DL — SIGNIFICANT CHANGE UP
CK MB BLD-MCNC: 2.1 % — SIGNIFICANT CHANGE UP (ref 0–3.5)
CK MB CFR SERPL CALC: 8.7 NG/ML — HIGH (ref 0–6.7)
CK SERPL-CCNC: 411 U/L — HIGH (ref 30–200)
CO2 SERPL-SCNC: 23 MMOL/L — SIGNIFICANT CHANGE UP (ref 22–31)
CREAT SERPL-MCNC: 0.92 MG/DL — SIGNIFICANT CHANGE UP (ref 0.5–1.3)
EGFR: 101 ML/MIN/1.73M2 — SIGNIFICANT CHANGE UP
EOSINOPHIL # BLD AUTO: 0.21 K/UL — SIGNIFICANT CHANGE UP (ref 0–0.5)
EOSINOPHIL NFR BLD AUTO: 3.4 % — SIGNIFICANT CHANGE UP (ref 0–6)
ESTIMATED AVERAGE GLUCOSE: 120 MG/DL — HIGH (ref 68–114)
GLUCOSE BLDC GLUCOMTR-MCNC: 103 MG/DL — HIGH (ref 70–99)
GLUCOSE BLDC GLUCOMTR-MCNC: 114 MG/DL — HIGH (ref 70–99)
GLUCOSE BLDC GLUCOMTR-MCNC: 120 MG/DL — HIGH (ref 70–99)
GLUCOSE BLDC GLUCOMTR-MCNC: 134 MG/DL — HIGH (ref 70–99)
GLUCOSE SERPL-MCNC: 111 MG/DL — HIGH (ref 70–99)
HCT VFR BLD CALC: 39.9 % — SIGNIFICANT CHANGE UP (ref 39–50)
HDLC SERPL-MCNC: 36 MG/DL — LOW
HGB BLD-MCNC: 13.6 G/DL — SIGNIFICANT CHANGE UP (ref 13–17)
IMM GRANULOCYTES NFR BLD AUTO: 0.3 % — SIGNIFICANT CHANGE UP (ref 0–0.9)
INR BLD: 1.03 RATIO — SIGNIFICANT CHANGE UP (ref 0.85–1.18)
LIPID PNL WITH DIRECT LDL SERPL: 133 MG/DL — HIGH
LYMPHOCYTES # BLD AUTO: 2.28 K/UL — SIGNIFICANT CHANGE UP (ref 1–3.3)
LYMPHOCYTES # BLD AUTO: 36.7 % — SIGNIFICANT CHANGE UP (ref 13–44)
MAGNESIUM SERPL-MCNC: 2.1 MG/DL — SIGNIFICANT CHANGE UP (ref 1.6–2.6)
MCHC RBC-ENTMCNC: 28.8 PG — SIGNIFICANT CHANGE UP (ref 27–34)
MCHC RBC-ENTMCNC: 34.1 GM/DL — SIGNIFICANT CHANGE UP (ref 32–36)
MCV RBC AUTO: 84.4 FL — SIGNIFICANT CHANGE UP (ref 80–100)
MONOCYTES # BLD AUTO: 0.52 K/UL — SIGNIFICANT CHANGE UP (ref 0–0.9)
MONOCYTES NFR BLD AUTO: 8.4 % — SIGNIFICANT CHANGE UP (ref 2–14)
NEUTROPHILS # BLD AUTO: 3.15 K/UL — SIGNIFICANT CHANGE UP (ref 1.8–7.4)
NEUTROPHILS NFR BLD AUTO: 50.6 % — SIGNIFICANT CHANGE UP (ref 43–77)
NON HDL CHOLESTEROL: 160 MG/DL — HIGH
NRBC # BLD: 0 /100 WBCS — SIGNIFICANT CHANGE UP (ref 0–0)
PHOSPHATE SERPL-MCNC: 3 MG/DL — SIGNIFICANT CHANGE UP (ref 2.5–4.5)
PLATELET # BLD AUTO: 255 K/UL — SIGNIFICANT CHANGE UP (ref 150–400)
POTASSIUM SERPL-MCNC: 3.8 MMOL/L — SIGNIFICANT CHANGE UP (ref 3.5–5.3)
POTASSIUM SERPL-SCNC: 3.8 MMOL/L — SIGNIFICANT CHANGE UP (ref 3.5–5.3)
PROTHROM AB SERPL-ACNC: 11.3 SEC — SIGNIFICANT CHANGE UP (ref 9.5–13)
RBC # BLD: 4.73 M/UL — SIGNIFICANT CHANGE UP (ref 4.2–5.8)
RBC # FLD: 13.3 % — SIGNIFICANT CHANGE UP (ref 10.3–14.5)
SODIUM SERPL-SCNC: 141 MMOL/L — SIGNIFICANT CHANGE UP (ref 135–145)
TRIGL SERPL-MCNC: 151 MG/DL — HIGH
TROPONIN T, HIGH SENSITIVITY RESULT: 44 NG/L — SIGNIFICANT CHANGE UP (ref 0–51)
TROPONIN T, HIGH SENSITIVITY RESULT: 45 NG/L — SIGNIFICANT CHANGE UP (ref 0–51)
TSH SERPL-MCNC: 0.21 UIU/ML — LOW (ref 0.27–4.2)
WBC # BLD: 6.22 K/UL — SIGNIFICANT CHANGE UP (ref 3.8–10.5)
WBC # FLD AUTO: 6.22 K/UL — SIGNIFICANT CHANGE UP (ref 3.8–10.5)

## 2024-09-01 PROCEDURE — 93306 TTE W/DOPPLER COMPLETE: CPT | Mod: 26

## 2024-09-01 PROCEDURE — 93458 L HRT ARTERY/VENTRICLE ANGIO: CPT | Mod: 26

## 2024-09-01 PROCEDURE — 99152 MOD SED SAME PHYS/QHP 5/>YRS: CPT

## 2024-09-01 PROCEDURE — 99233 SBSQ HOSP IP/OBS HIGH 50: CPT

## 2024-09-01 PROCEDURE — 99223 1ST HOSP IP/OBS HIGH 75: CPT

## 2024-09-01 RX ORDER — DEXTROSE 15 G/33 G
25 GEL IN PACKET (GRAM) ORAL ONCE
Refills: 0 | Status: DISCONTINUED | OUTPATIENT
Start: 2024-09-01 | End: 2024-09-02

## 2024-09-01 RX ORDER — PANTOPRAZOLE SODIUM 40 MG
40 TABLET, DELAYED RELEASE (ENTERIC COATED) ORAL
Refills: 0 | Status: DISCONTINUED | OUTPATIENT
Start: 2024-09-01 | End: 2024-09-02

## 2024-09-01 RX ORDER — DEXTROSE 15 G/33 G
12.5 GEL IN PACKET (GRAM) ORAL ONCE
Refills: 0 | Status: DISCONTINUED | OUTPATIENT
Start: 2024-09-01 | End: 2024-09-02

## 2024-09-01 RX ORDER — ACETAMINOPHEN 325 MG/1
2 TABLET ORAL
Qty: 0 | Refills: 0 | DISCHARGE
Start: 2024-09-01

## 2024-09-01 RX ORDER — LISINOPRIL 10 MG/1
10 TABLET ORAL DAILY
Refills: 0 | Status: DISCONTINUED | OUTPATIENT
Start: 2024-09-01 | End: 2024-09-02

## 2024-09-01 RX ORDER — ASPIRIN 81 MG
81 TABLET, DELAYED RELEASE (ENTERIC COATED) ORAL DAILY
Refills: 0 | Status: DISCONTINUED | OUTPATIENT
Start: 2024-09-01 | End: 2024-09-02

## 2024-09-01 RX ORDER — ACETAMINOPHEN 325 MG/1
650 TABLET ORAL EVERY 6 HOURS
Refills: 0 | Status: DISCONTINUED | OUTPATIENT
Start: 2024-09-01 | End: 2024-09-02

## 2024-09-01 RX ORDER — GLUCAGON INJECTION, SOLUTION 1 MG/.2ML
1 INJECTION, SOLUTION SUBCUTANEOUS ONCE
Refills: 0 | Status: DISCONTINUED | OUTPATIENT
Start: 2024-09-01 | End: 2024-09-02

## 2024-09-01 RX ORDER — ASPIRIN 81 MG
81 TABLET, DELAYED RELEASE (ENTERIC COATED) ORAL DAILY
Refills: 0 | Status: DISCONTINUED | OUTPATIENT
Start: 2024-09-01 | End: 2024-09-01

## 2024-09-01 RX ORDER — TICAGRELOR 90 MG/1
90 TABLET ORAL EVERY 12 HOURS
Refills: 0 | Status: DISCONTINUED | OUTPATIENT
Start: 2024-09-01 | End: 2024-09-01

## 2024-09-01 RX ORDER — SODIUM CHLORIDE 9 MG/ML
1000 INJECTION INTRAMUSCULAR; INTRAVENOUS; SUBCUTANEOUS
Refills: 0 | Status: DISCONTINUED | OUTPATIENT
Start: 2024-09-01 | End: 2024-09-02

## 2024-09-01 RX ORDER — DEXTROSE 15 G/33 G
15 GEL IN PACKET (GRAM) ORAL ONCE
Refills: 0 | Status: DISCONTINUED | OUTPATIENT
Start: 2024-09-01 | End: 2024-09-02

## 2024-09-01 RX ADMIN — Medication 81 MILLIGRAM(S): at 07:44

## 2024-09-01 RX ADMIN — Medication 40 MILLIGRAM(S): at 05:44

## 2024-09-01 RX ADMIN — Medication 10 MILLIGRAM(S): at 12:36

## 2024-09-01 RX ADMIN — Medication 40 MILLIGRAM(S): at 22:02

## 2024-09-01 RX ADMIN — LISINOPRIL 10 MILLIGRAM(S): 10 TABLET ORAL at 13:55

## 2024-09-01 RX ADMIN — Medication 900 UNIT(S)/HR: at 06:54

## 2024-09-01 RX ADMIN — TICAGRELOR 90 MILLIGRAM(S): 90 TABLET ORAL at 05:44

## 2024-09-01 RX ADMIN — Medication 1000 UNIT(S)/HR: at 00:00

## 2024-09-01 NOTE — DISCHARGE NOTE PROVIDER - HOSPITAL COURSE
HPI:  Pt is a 50yo M w/ PMH of HTN, HLD, DM2, active smoker pw CP and SOB to OSH, tx to [facility] for NSTEMI.    Reports being woken up from sleep at 4AM w/ constant pressure like 10/10 substernal/left sided CP a/w SOB. Denies similar symptoms in the past, and no diaphoresis, palpitations, abd pain, N/V. No recent travel or sick contacts.     At OSH noted to have uptrending troponin w/ ECG w/out acute ischemic changes. He was ASA/Brilinta loaded, started on hep gtt and tx here.    Here pt CP free w/ VSS and labs nonactionable w/ trop 42 -> 45.   (31 Aug 2024 23:55)    Hospital Course:  Patient was admitted to medicine for NSTEMI, trops were elevated and patient was started on ASA/Brillinta and heparin gtt. Cardiology called and planned for LHC. s/p LHC via RRA on 9/1 with Dr. Zamorano, no current indication for further inpatient cardiovascular interventions.  TSH low on inpatient labs, can follow up with PCP outpatient. Cardiology notes "cannot exclude the possibility of vasospastic angina. The patient will need smoking cessation counseling and consideration of discharge with smoking cessation aids. If the chest pain recurs, can consider commencing amlodipine; however this does not need to be done as an outpatient so long as his chest pain does not recur. "    Patient is medically cleared for discharge. Medication reconciliation reviewed, revised, and resolved with Dr. Driver who had medically cleared patient for discharge with follow-up as advised. Patient is currently stable for discharge to home at this time.    Important Medication Changes and Reason:  - Stop lovestatin 40mg PO qD  - Start atorvastatin 40mg PO qD  - continue ASA 81mg PO qd    Active or Pending Issues Requiring Follow-up:  - Cardiology  - PCP for decreased TSH    Advanced Directives:   [x] Full code  [ ] DNR  [ ] Hospice    Discharge Diagnoses:  NSTEMI  DM2  HTN HPI:  Pt is a 48yo M w/ PMH of HTN, HLD, DM2, active smoker pw CP and SOB to OSH, tx to [facility] for NSTEMI.    Reports being woken up from sleep at 4AM w/ constant pressure like 10/10 substernal/left sided CP a/w SOB. Denies similar symptoms in the past, and no diaphoresis, palpitations, abd pain, N/V. No recent travel or sick contacts.     At OSH noted to have uptrending troponin w/ ECG w/out acute ischemic changes. He was ASA/Brilinta loaded, started on hep gtt and tx here.    Here pt CP free w/ VSS and labs nonactionable w/ trop 42 -> 45.   (31 Aug 2024 23:55)    Hospital Course:  Patient was admitted to medicine for NSTEMI, trops were elevated and patient was started on ASA/Brillinta and heparin gtt. Cardiology called and planned for LHC. s/p LHC via RRA on 9/1 with Dr. Zamorano, no current indication for further inpatient cardiovascular interventions. Cath with nonobstructive cad. Unable to rule out coronary vasopasms. TSH low on inpatient labs, can follow up with PCP outpatient. Cardiology notes "cannot exclude the possibility of vasospastic angina. The patient will need smoking cessation counseling and consideration of discharge with smoking cessation aids. If the chest pain recurs, can consider commencing amlodipine; however this does not need to be done as an outpatient so long as his chest pain does not recur. "    Patient is medically cleared for discharge. Medication reconciliation reviewed, revised, and resolved with Dr. Driver who had medically cleared patient for discharge with follow-up as advised. Patient is currently stable for discharge to home at this time.    Important Medication Changes and Reason:  - Stop lovestatin 40mg PO qD  - Start atorvastatin 40mg PO qD  - continue ASA 81mg PO qd    Active or Pending Issues Requiring Follow-up:  - Cardiology  - PCP for decreased TSH    Advanced Directives:   [x] Full code  [ ] DNR  [ ] Hospice    Discharge Diagnoses:  NSTEMI  DM2  HTN

## 2024-09-01 NOTE — DISCHARGE NOTE PROVIDER - NSFOLLOWUPCLINICS_GEN_ALL_ED_FT
[facility] - Primary Care  Primary Care  [address] [place] [address]  Phone: [phone]  Fax:   Follow Up Time: 1 week    Cardiology at [facility]  Cardiology  [address] [place] [address]  Phone: [phone]  Fax:   Follow Up Time: 2 weeks

## 2024-09-01 NOTE — PATIENT PROFILE ADULT - FUNCTIONAL ASSESSMENT - DAILY ACTIVITY 3.
[facility] is calling today per Xochitl in ASC dept - Dr King wants us to put in a message that patent was seen yesterday and follow up call was made by Xochitl.    Patient complained of itching on back.  Dr King wanted this noted. RON    4 = No assist / stand by assistance

## 2024-09-01 NOTE — CONSULT NOTE ADULT - SUBJECTIVE AND OBJECTIVE BOX
Nabil Duarte MD   Cardiology Fellow  [phone]   All Cardiology service information can be found 24/7 on [email or web address], password: [email or web address]    Patient seen and evaluated at bedside    HPI:  Pt is a 50yo M w/ PMH of HTN, HLD, DM2 (on metformin), active smoker pw CP and SOB to [facility], tx to [facility] for NSTEMI. Reports being woken up from sleep at 4AM w/ constant pressure like 10/10 substernal/left sided CP a/w SOB.  Denies similar symptoms in the past, and no diaphoresis, palpitations, abd pain, N/V. No recent travel or sick contacts.     Cardiology consulted for transfer to [facility]. On report, told that pain had continued from 4 AM -12 PM without resolving and only resolved after ASA loaded with 325mg. When cardiology was consulted, we recommended additional Brilinta 180mg and heparin gtt.     At OSH, HsTn 11-> 18-> 35 and here at [facility] 42-> 45. ECG with PVC and biphasic p wave in V1.         Cardiac Meds:    PMHx:   HTN (hypertension)  HLD (hyperlipidemia)  DM (diabetes mellitus)    PSHx:   H/O hernia repair      Allergies:  No Known Allergies      Current Medications:   acetaminophen     Tablet .. 650 milliGRAM(s) Oral every 6 hours PRN  aspirin enteric coated 81 milliGRAM(s) Oral daily  atorvastatin 40 milliGRAM(s) Oral at bedtime  dextrose 5%. 1000 milliLiter(s) IV Continuous <Continuous>  dextrose 5%. 1000 milliLiter(s) IV Continuous <Continuous>  dextrose 50% Injectable 12.5 Gram(s) IV Push once  dextrose 50% Injectable 25 Gram(s) IV Push once  dextrose 50% Injectable 25 Gram(s) IV Push once  dextrose Oral Gel 15 Gram(s) Oral once PRN  glucagon  Injectable 1 milliGRAM(s) IntraMuscular once  heparin   Injectable 6000 Unit(s) IV Push every 6 hours PRN  heparin  Infusion.  Unit(s)/Hr IV Continuous <Continuous>  insulin lispro (ADMELOG) corrective regimen sliding scale   SubCutaneous three times a day before meals  insulin lispro (ADMELOG) corrective regimen sliding scale   SubCutaneous at bedtime  pantoprazole    Tablet 40 milliGRAM(s) Oral before breakfast  ticagrelor 90 milliGRAM(s) Oral every 12 hours      FAMILY HISTORY:  No pertinent family history in first degree relatives          REVIEW OF SYSTEMS:  CONSTITUTIONAL: No weakness, fevers or chills  EYES/ENT: No visual changes;  No dysphagia  NECK: No pain or stiffness  RESPIRATORY: No cough, wheezing, hemoptysis; No shortness of breath  CARDIOVASCULAR: No chest pain or palpitations; No lower extremity edema  GASTROINTESTINAL: No abdominal or epigastric pain. No nausea, vomiting, or hematemesis; No diarrhea or constipation. No melena or hematochezia.  BACK: No back pain  GENITOURINARY: No dysuria, frequency or hematuria  NEUROLOGICAL: No numbness or weakness  SKIN: No itching, burning, rashes, or lesions   All other review of systems is negative unless indicated above.    Physical Exam:  T(F): 98.1 (09-01), Max: 98.9 (08-31)  HR: 73 (09-01) (62 - 78)  BP: 159/100 (09-01) (130/84 - 159/100)  RR: 18 (09-01)  SpO2: 98% (09-01)    GEN: NAD  HEENT: EOMI, clear sclera  PULM: CTA b/l, no wheeze  CV: RRR S1 S2, no murmur, no JVD  ABD: S, NT, ND  EXT: WWP, no edema  PSYCH: normal affect  SKIN: No rash    CXR: Personally reviewed    Labs: Personally reviewed                        13.4   8.15  )-----------( 291      ( 31 Aug 2024 20:09 )             39.4     08-31    141  |  106  |  12  ----------------------------<  87  3.8   |  23  |  0.96    Ca    9.1      31 Aug 2024 20:09  Mg     2.0     08-31    TPro  6.7  /  Alb  3.9  /  TBili  0.7  /  DBili  x   /  AST  25  /  ALT  28  /  AlkPhos  42  08-31    PT/INR - ( 31 Aug 2024 23:35 )   PT: 11.3 sec;   INR: 1.03 ratio         PTT - ( 31 Aug 2024 23:35 )  PTT:71.0 sec    CARDIAC MARKERS ( 31 Aug 2024 23:35 )  45 ng/L / x     / x     / x     / x     / x      CARDIAC MARKERS ( 31 Aug 2024 20:09 )  42 ng/L / x     / x     / x     / x     / 10.6 ng/mL

## 2024-09-01 NOTE — CONSULT NOTE ADULT - ATTENDING COMMENTS
CARDIOLOGY ATTENDING CONSULT NOTE     HPI    LYUBOV MORAN is a 49y Male with a history of     ROS  Relevant ROS above in HPI    PMH  As above    PSH  As above    FH/SH    PE  Vitals  Gen: NAD, breathing comfortably  HEENT: MMM, anicteric sclera  Neck: JVP < 10 cm  Cardiac: RRR, no m/r/g  Lungs: Adequate inspiratory effort. CTAB  Abd: soft, NT/ND. +BS  Ext: warm and well perfused. No significant LE edema  Psych: grossly normal affect  Neuro: moves all extremities    Data:  I have personally reviewed all data:  Labs reviewed     CXR  IMPRESSION:  No focal consolidations.    EKG:  sinus rhythm. ST elevations in inferior leads. Biphasic T waves in V4 to V6    Echo:    A/P    LYUBOV MORAN is a 49y Male with a history of     Rafael Quinones MD, [facility]    For [facility] Cardiology and Cardiovascular Surgery 24/7 service contact information, please go to [email or web address] ("[email or web address]" to login) CARDIOLOGY ATTENDING CONSULT NOTE     FH/SH  1PPD since teenage years  Social alcohol    PE  Vitals  Vital Signs Last 24 Hrs  T(C): 36.4 (01 Sep 2024 13:10), Max: 37.2 (31 Aug 2024 19:28)  T(F): 97.5 (01 Sep 2024 13:10), Max: 98.9 (31 Aug 2024 19:28)  HR: 65 (01 Sep 2024 13:10) (56 - 78)  BP: 138/85 (01 Sep 2024 13:10) (130/84 - 161/97)  BP(mean): 112 (31 Aug 2024 23:03) (112 - 114)  RR: 18 (01 Sep 2024 13:10) (16 - 24)  SpO2: 96% (01 Sep 2024 13:10) (96% - 99%)    Gen: NAD, breathing comfortably  HEENT: MMM, anicteric sclera  Neck: JVP < 10 cm  Cardiac: RRR, no m/r/g  Lungs: Adequate inspiratory effort. CTAB  Abd: soft, NT/ND. +BS  Ext: warm and well perfused. No significant LE edema    Data:  I have personally reviewed all data:  Labs reviewed     CXR  IMPRESSION:  No focal consolidations.    EKG:  sinus rhythm. ST elevations in inferior leads. Biphasic T waves in V4 to V6    Echo:    MEDICATIONS  (STANDING):  aspirin enteric coated 81 milliGRAM(s) Oral daily  atorvastatin 40 milliGRAM(s) Oral at bedtime  hydrochlorothiazide 12.5 milliGRAM(s) Oral daily  insulin lispro (ADMELOG) corrective regimen sliding scale   SubCutaneous at bedtime  insulin lispro (ADMELOG) corrective regimen sliding scale   SubCutaneous three times a day before meals  lisinopril 10 milliGRAM(s) Oral daily  pantoprazole    Tablet 40 milliGRAM(s) Oral before breakfast      A/P    LYUBOV MORAN is a 49y Male with a history of HTN, HLD and DM, chronic tobacco abuse who presented with chest pain and SOB concerning for possible unstable angina. He underwent LHC which showed mild CAD (preliminary report)    #chest pain  #HLD  #HTN  #abnormal TSH    No obstructive lesion seen on LHC. LVEDP high on LHC without elevated proBNP or evidence of HF on exam. It is possible this is from suboptimally controlled high blood pressure    No ACS but I cannot exclude the possibility of vasospastic angina. The patient will need smoking cessation counseling and consideration of discharge with smoking cessation aids. If the chest pain recurs, can consider commencing amlodipine; however this does not need to be done as an outpatient so long as his chest pain does not recur. He has not received his home HCTZ so it is hard to know where is blood pressure ranges lie on his current therapy    No current indication for further inpatient cardiovascular testing. Defer evaluation of abnormal TSH to the primary service    The patient can follow-up with me as an outpatient within 2-4 weeks upn discharge if he does not have a cardiologist  Rafael Quinones MD, [facility]  Cardiology at [address], [address]  [place] [address]  [phone]    Thank you for allowing us to participate in this patient's care. Please do not hesitate to reach out to our service for any further questions.      Rafael Quinones MD, [facility]    For [facility] Cardiology and Cardiovascular Surgery 24/7 service contact information, please go to [email or web address] ("[email or web address]" to login)

## 2024-09-01 NOTE — CHART NOTE - NSCHARTNOTEFT_GEN_A_CORE
Pt s/p diagnostic cath, normal coronaries, via RRA.  Pt noted to be hypertensive to 163/105, s/p 10mg IV labetalol in cath recovery.  Pt's home meds were not entered at time of admission, pt's pharmacy, [facility] in [facility] called and meds verified and entered in the system and med rec done.     Pt is a 50yo M w/ PMH of HTN, HLD, DM2, active smoker pw CP and SOB to OSH, tx to [facility] for NSTEMI.    Reports being woken up from sleep at 4AM w/ constant pressure like 10/10 substernal/left sided CP a/w SOB. Denies similar symptoms in the past, and no diaphoresis, palpitations, abd pain, N/V. No recent travel or sick contacts.     At OSH noted to have uptrending troponin w/ ECG w/out acute ischemic changes. He was ASA/Brilinta loaded, started on hep gtt and tx here.    Here pt CP free w/ VSS and labs non actionable w/ trop 42 -> 45.       S/p diagnostic cath via RRA, by Dr Zamorano on Sunday 9/1/24  continue home meds. should hold home metformin for 48hrs post cath, resume on 9/4  monitor BP, elevated pre and post cath, was not on his home meds, s/p 10mg IVP labetalol with improvement  home meds verified and entered   ACS ruled out and Brilinta and heparin gtt discontinued  post cath education given  discharge recs by cardiology and primary team     Pedro Gutiérrez NP  cath lab  [phone]

## 2024-09-01 NOTE — DISCHARGE NOTE PROVIDER - ATTENDING DISCHARGE PHYSICAL EXAMINATION:
PHYSICAL EXAM  GENERAL: NAD, lying comfortably in bed   HEAD:  Atraumatic, Normocephalic  EYES: EOMI b/l, conjunctiva and sclera clear  NECK: Supple, No LAD   CHEST/LUNG: Clear to auscultation bilaterally; No wheeze or ronchi  HEART: Regular rate and rhythm; S1 and S2 present, No murmurs, rubs, or gallops  ABDOMEN: Soft, Nontender, Nondistended; Bowel sounds present  EXTREMITIES:  2+ Peripheral Pulses, No edema. right wrist with no hematoma  NEURO: AAOx3, non-focal   SKIN: No rashes or lesions

## 2024-09-01 NOTE — PROGRESS NOTE ADULT - NSPROGADDITIONALINFOA_GEN_ALL_CORE
dispo planning 50 minutes spent    Hina Driver MD  Division of Hospital Medicine  Available on [employer] Teams

## 2024-09-01 NOTE — DISCHARGE NOTE PROVIDER - NSDCCPTREATMENT_GEN_ALL_CORE_FT
PRINCIPAL PROCEDURE  Procedure: Left heart cardiac cath  Findings and Treatment:   < end of copied text >  Coronary Angiography   The coronary circulation is right dominant.    LAD   Mid left anterior descending: There is a 20 % stenosis.    CX   Proximal circumflex: There is a 15 % stenosis. First obtuse marginal:  The segment is visually normal in size and structure.  Second obtuse marginal: Thesegment is visually normal in size and  structure.  RCA   Proximal right coronary artery: There is a 20 % stenosis. Distal right  coronary artery: There is a 10 % stenosis.  Left Heart Cath   LV to AO pullback was performed. LHC performed: Aorticvalve crossed  and left ventricular pressures were obtained.< from: Cardiac Catheterization (09.01.24 @ 11:40) >        SECONDARY PROCEDURE  Procedure: Transthoracic echocardiography (TTE)  Findings and Treatment:   CONCLUSIONS:      1. Left ventricular cavity is normal in size. Left ventricular wall thickness is normal. Left ventricular systolic function is normal with an ejection fraction of 66 % by Orellana's method of disks. There are no regional wall motion abnormalities seen.   2. Normal right ventricular cavity size and normal right ventricular systolic function.   3. No significant valvular disease.   4. No pericardial effusion seen.   5. No prior echocardiogram is available for comparison.  ________________________________________________________________________________________  FINDINGS:  Left Ventricle:  The left ventricular cavity is normal in size. Left ventricular wall thickness is normal. Left ventricular systolic function is normal with a calculated ejection fraction of 66 % by the Orellana's biplane method of disks. There are no regional wall motion abnormalities seen. There is normal LV mass and concentric remodeling. There is normal left ventricular diastolic function, with normal left ventricular filling pressure.     Right Ventricle:  The right ventricular cavity is normal in size and right ventricular systolic function is normal. Tricuspid annular plane systolic excursion (TAPSE) is 2.1 cm (normal >=1.7 cm).     Left Atrium:  The left atrium is normal in size with an indexed volume of 26.92 ml/m².     Right Atrium:  The right atrium is normal in size with an indexed volume of 13.55 ml/m².     Interatrial Septum:  The interatrial septu  < end of copied text >  m was not well visualized.     Aortic Valve:  The aortic valve is tricuspid with normal leaflet excursion. There is no aortic valve stenosis. There is no evidence of aortic regurgitation.     Mitral Valve:  Structurally normal mitral valve with normal leaflet excursion. There is no mitral valve stenosis. There is mild mitral regurgitation.     Tricuspid Valve:  Structurally normal tricuspid valve with normal leaflet excursion. There is trace tricuspid regurgitation. There is insufficient tricuspid regurgitation detected to calcul

## 2024-09-01 NOTE — DISCHARGE NOTE PROVIDER - NSDCCPCAREPLAN_GEN_ALL_CORE_FT
PRINCIPAL DISCHARGE DIAGNOSIS  Diagnosis: NSTEMI (non-ST elevation myocardial infarction)  Assessment and Plan of Treatment: A non-ST-elevation myocardial infarction (NSTEMI) is a type of heart attack that usually happens when your heart’s need for oxygen can’t be met. This condition gets its name because it doesn’t have an easily identifiable electrical pattern (ST elevation) like the other main types of heart attacks.  A heart attack happens when the blood vessels that supply blood to your heart are blocked. This can damage your heart or lead to an abnormal heart rhythm or heart failure. A heart attack is also called a myocardial infarction. Cardiology has seen you during your hospitalization and started you on medications. Continue taking all your medications as prescribed and follow-up with your PCP and cardiologist within 1 week of discharge for further management and treatment.  DISCHARGE INSTRUCTIONS:  Call your local emergency number (911 in the ) for any of the following:  You have any of the following signs of a heart attack:  Squeezing, pressure, or pain in your chest  You may also have any of the following:  Discomfort or pain in your back, neck, jaw, stomach, or arm  Shortness of breath  Nausea or vomiting  Lightheadedness or a sudden cold sweat  Seek care immediately if:  You are tired and cannot think clearly.  Your heart is beating faster than usual.  You are bleeding from your gums or nose.  You see blood in your urine or bowel movements.  You urinate less than usual or not at all.  You have new or increased swelling in your feet or ankles.  Call your doctor or cardiologist if:  You have trouble taking your heart medicine.  You have questions or concerns about your condition or care.        SECONDARY DISCHARGE DIAGNOSES  Diagnosis: HTN (hypertension)  Assessment and Plan of Treatment: Continue to follow a low salt/sodium diet.  Perform physical activities as tolerated in consultation with your Primary Care Provider and physical therapist.  Take all medications as prescribed.  Follow up with your medical doctor for routine blood pressure monitoring at your next visit.  Notify your doctor if you have any of the following symptoms:  Dizziness, lightheadedness, blurry vision, headache, chest pain, or shortness of breath.      Diagnosis: Encounter for smoking cessation counseling  Assessment and Plan of Treatment: Tobacco is a killer. People who smoke or use other forms of tobacco are more likely to develop disease and die earlier than are people who don't use tobacco. If you smoke, you may worry about what it's doing to your health. You probably worry, too, about how hard it might be to stop smoking. Nicotine is highly addictive, and to quit smoking — especially without help — can be difficult. In fact, most people don't succeed the first time they try to quit. It may take more than one try, but you can stop smoking. Take that first step: Decide to stop smoking. Set a quit date. And then take advantage of the multitude of resources available to help you successfully quit smoking. Please follow up with your outpatient provider for more information regarding methods of smoking cessation, including, but not limited to: nicotine patches/gum/lozenges, medications like Chantix.    Diagnosis: Type 2 diabetes mellitus  Assessment and Plan of Treatment: You have had a cardiac cath. Please do not resume metformin until 48 hours AFTER your cardiac cath. After that you may resume your medication     PRINCIPAL DISCHARGE DIAGNOSIS  Diagnosis: NSTEMI (non-ST elevation myocardial infarction)  Assessment and Plan of Treatment: You were admitted for concerns for A non-ST-elevation myocardial infarction (NSTEMI) is a type of heart attack that usually happens when your heart’s need for oxygen can’t be met. You underwent a cardiac cath in which your coronary vessels had nonobstructive coronary artery disease (thus, unlikely heart attack). Please take Aspirin and atorvastatin. We are unable to rule out coronary vasopasms as a cause for your chest pain but treatment for that is smoking cessation and possible need for amlopdine if chest pain reoccurs. Please follow up with your cardiologist and PCP. You can restart your metformin on 9/4  DISCHARGE INSTRUCTIONS:  Call your local emergency number (911 in the [place]) for any of the following:  You have any of the following signs of a heart attack:  Squeezing, pressure, or pain in your chest  You may also have any of the following:  Discomfort or pain in your back, neck, jaw, stomach, or arm  Shortness of breath  Nausea or vomiting  Lightheadedness or a sudden cold sweat  Seek care immediately if:  You are tired and cannot think clearly.  Your heart is beating faster than usual.  You are bleeding from your gums or nose.  You see blood in your urine or bowel movements.  You urinate less than usual or not at all.  You have new or increased swelling in your feet or ankles.  Call your doctor or cardiologist if:  You have trouble taking your heart medicine.  You have questions or concerns about your condition or care.        SECONDARY DISCHARGE DIAGNOSES  Diagnosis: HTN (hypertension)  Assessment and Plan of Treatment: Continue to follow a low salt/sodium diet.  Perform physical activities as tolerated in consultation with your Primary Care Provider and physical therapist.  Take all medications as prescribed.  Follow up with your medical doctor for routine blood pressure monitoring at your next visit.  Notify your doctor if you have any of the following symptoms:  Dizziness, lightheadedness, blurry vision, headache, chest pain, or shortness of breath.      Diagnosis: Encounter for smoking cessation counseling  Assessment and Plan of Treatment: Tobacco is a killer. People who smoke or use other forms of tobacco are more likely to develop disease and die earlier than are people who don't use tobacco. If you smoke, you may worry about what it's doing to your health. You probably worry, too, about how hard it might be to stop smoking. Nicotine is highly addictive, and to quit smoking — especially without help — can be difficult. In fact, most people don't succeed the first time they try to quit. It may take more than one try, but you can stop smoking. Take that first step: Decide to stop smoking. Set a quit date. And then take advantage of the multitude of resources available to help you successfully quit smoking. Please follow up with your outpatient provider for more information regarding methods of smoking cessation, including, but not limited to: nicotine patches/gum/lozenges, medications like Chantix.    Diagnosis: Type 2 diabetes mellitus  Assessment and Plan of Treatment: You have had a cardiac cath. Please do not resume metformin until 48 hours AFTER your cardiac cath. After that you may resume your medication     PRINCIPAL DISCHARGE DIAGNOSIS  Diagnosis: NSTEMI (non-ST elevation myocardial infarction)  Assessment and Plan of Treatment: You were admitted for concerns for A non-ST-elevation myocardial infarction (NSTEMI) is a type of heart attack that usually happens when your heart’s need for oxygen can’t be met. You underwent a cardiac cath in which your coronary vessels had nonobstructive coronary artery disease (thus, unlikely heart attack). Please take Aspirin and atorvastatin. We are unable to rule out coronary vasopasms as a cause for your chest pain but treatment for that is smoking cessation and possible need for amlopdine if chest pain reoccurs. Please follow up with your cardiologist and PCP. You can restart your metformin on 9/4. Also see your PCP to recheck your thyroid levels.  DISCHARGE INSTRUCTIONS:  Call your local emergency number (911 in the ) for any of the following:  You have any of the following signs of a heart attack:  Squeezing, pressure, or pain in your chest  You may also have any of the following:  Discomfort or pain in your back, neck, jaw, stomach, or arm  Shortness of breath  Nausea or vomiting  Lightheadedness or a sudden cold sweat  Seek care immediately if:  You are tired and cannot think clearly.  Your heart is beating faster than usual.  You are bleeding from your gums or nose.  You see blood in your urine or bowel movements.  You urinate less than usual or not at all.  You have new or increased swelling in your feet or ankles.  Call your doctor or cardiologist if:  You have trouble taking your heart medicine.  You have questions or concerns about your condition or care.        SECONDARY DISCHARGE DIAGNOSES  Diagnosis: HTN (hypertension)  Assessment and Plan of Treatment: Continue to follow a low salt/sodium diet.  Perform physical activities as tolerated in consultation with your Primary Care Provider and physical therapist.  Take all medications as prescribed.  Follow up with your medical doctor for routine blood pressure monitoring at your next visit.  Notify your doctor if you have any of the following symptoms:  Dizziness, lightheadedness, blurry vision, headache, chest pain, or shortness of breath.      Diagnosis: Encounter for smoking cessation counseling  Assessment and Plan of Treatment: Tobacco is a killer. People who smoke or use other forms of tobacco are more likely to develop disease and die earlier than are people who don't use tobacco. If you smoke, you may worry about what it's doing to your health. You probably worry, too, about how hard it might be to stop smoking. Nicotine is highly addictive, and to quit smoking — especially without help — can be difficult. In fact, most people don't succeed the first time they try to quit. It may take more than one try, but you can stop smoking. Take that first step: Decide to stop smoking. Set a quit date. And then take advantage of the multitude of resources available to help you successfully quit smoking. Please follow up with your outpatient provider for more information regarding methods of smoking cessation, including, but not limited to: nicotine patches/gum/lozenges, medications like Chantix.    Diagnosis: Type 2 diabetes mellitus  Assessment and Plan of Treatment: You have had a cardiac cath. Please do not resume metformin until 48 hours AFTER your cardiac cath. After that you may resume your medication

## 2024-09-01 NOTE — PROGRESS NOTE ADULT - PROBLEM SELECTOR PLAN 1
- Typical CP w/ rising trop at OSH now CP free  - s/p ASA/Brilinta load and started on hep gtt now dc.  - s/p cardiac cath with nonobtstuctive cad- unable to rule out vasopasmic coronaries.   - c/w ASA 81mg qd  -outpatient within 2-4 weeks post discharge with Dr. Quinones or his own cardiologist

## 2024-09-01 NOTE — CONSULT NOTE ADULT - ASSESSMENT
Pt is a 48yo M w/ PMH of HTN, HLD, DM2 (on metformin), active smoker pw CP and SOB to [facility], tx to [facility] for NSTEMI. Reports being woken up from sleep at 4AM w/ constant pressure like 10/10 substernal/left sided CP a/w SOB.  Denies similar symptoms in the past, and no diaphoresis, palpitations, abd pain, N/V. No recent travel or sick contacts.     With pain that resolved after ASA loaded, symptoms that woke him from sleep with pressure sensation, multiple risk factors, denial of any acid reflux symptoms, and at OSH, HsTn 11-> 18-> 35 (above cutoff of normal) and here at [facility] 42-> 45, and ECG with PVC and biphasic p wave in V1, concern is for NSTEMI as symptoms concerning for ACS.     #NSTEMI:  - S/p ASA 325mg, Brilinta 180mg load  - JUAN Risk Score: 2, 2.2% risk at 14 days of: all-cause mortality, new or recurrent MI, or severe recurrent ischemia requiring urgent revascularization.  [] Cont hep gtt   [] TTE   [] Check Hgb A1c, TSH, Lipid panel   [] Plan for LHC with Dr. Zamorano in AM       Patient discussed with Dr. Zamorano.       Nabil Duarte MD  Cardiology Fellow   Pt is a 50yo M w/ PMH of HTN, HLD, DM2 (on metformin), active smoker pw CP and SOB to [facility], tx to [facility] for NSTEMI. Reports being woken up from sleep at 4AM w/ constant pressure like 10/10 substernal/left sided CP a/w SOB.  Denies similar symptoms in the past, and no diaphoresis, palpitations, abd pain, N/V. No recent travel or sick contacts.     ECG with PVC and biphasic p wave in V1, no ST elevations or depressions.     With pain that resolved after ASA loaded, symptoms that woke him from sleep with pressure sensation, multiple risk factors, denial of any acid reflux symptoms, and at OSH, HsTn 11-> 18-> 35 (above cutoff of normal at OSH per them) and here at [facility] 42-> 45, concern is for NSTEMI as symptoms concerning for ACS.     #NSTEMI:  - S/p ASA 325mg, Brilinta 180mg load  - JUAN Risk Score: 2, 2.2% risk at 14 days of: all-cause mortality, new or recurrent MI, or severe recurrent ischemia requiring urgent revascularization.  [] Cont hep gtt   [] TTE   [] Check Hgb A1c, TSH, Lipid panel   [] Plan for LHC with Dr. Zamorano in AM       Patient discussed with Dr. Zamorano.       Nabil Duarte MD  Cardiology Fellow

## 2024-09-01 NOTE — PROGRESS NOTE ADULT - PROBLEM SELECTOR PLAN 4
- Pt unclear on home regimen w/ no medications in surescripts  - appeciated med rec from cardiac cath. Resume metformin 9/4.    #Low TSH  -add on free thyroxine

## 2024-09-01 NOTE — PROGRESS NOTE ADULT - ASSESSMENT
50yo M w/ PMH of HTN, HLD, DM2, active smoker pw CP and SOB to OSH, tx to [facility] for NSTEMI vs unstable angina s/p nonobstructive cad on cath

## 2024-09-01 NOTE — PROGRESS NOTE ADULT - SUBJECTIVE AND OBJECTIVE BOX
Patient is a 49y old  Male who presents with a chief complaint of NSTEMI (01 Sep 2024 05:11)        SUBJECTIVE / OVERNIGHT EVENTS: Patient had no acute events overnight. Patient seen and examined at bedside this morning. No chest pain. Waiting for cardiac cath this AM    ROS: [ - ] Fever [ - ] Chills [ - ] Nausea/Vomiting [ - ] Chest Pain [ - ] Shortness of breath     MEDICATIONS  (STANDING):  aspirin enteric coated 81 milliGRAM(s) Oral daily  atorvastatin 40 milliGRAM(s) Oral at bedtime  dextrose 5%. 1000 milliLiter(s) (100 mL/Hr) IV Continuous <Continuous>  dextrose 5%. 1000 milliLiter(s) (50 mL/Hr) IV Continuous <Continuous>  dextrose 50% Injectable 12.5 Gram(s) IV Push once  dextrose 50% Injectable 25 Gram(s) IV Push once  dextrose 50% Injectable 25 Gram(s) IV Push once  glucagon  Injectable 1 milliGRAM(s) IntraMuscular once  hydrochlorothiazide 12.5 milliGRAM(s) Oral daily  insulin lispro (ADMELOG) corrective regimen sliding scale   SubCutaneous three times a day before meals  insulin lispro (ADMELOG) corrective regimen sliding scale   SubCutaneous at bedtime  lisinopril 10 milliGRAM(s) Oral daily  pantoprazole    Tablet 40 milliGRAM(s) Oral before breakfast  sodium chloride 0.9%. 1000 milliLiter(s) (75 mL/Hr) IV Continuous <Continuous>    MEDICATIONS  (PRN):  acetaminophen     Tablet .. 650 milliGRAM(s) Oral every 6 hours PRN Temp greater or equal to 38C (100.4F), Mild Pain (1 - 3)  dextrose Oral Gel 15 Gram(s) Oral once PRN Blood Glucose LESS THAN 70 milliGRAM(s)/deciliter      Vital Signs Last 24 Hrs  T(C): 36.4 (01 Sep 2024 13:10), Max: 37.2 (31 Aug 2024 19:28)  T(F): 97.5 (01 Sep 2024 13:10), Max: 98.9 (31 Aug 2024 19:28)  HR: 73 (01 Sep 2024 15:00) (56 - 78)  BP: 133/87 (01 Sep 2024 15:00) (130/84 - 161/97)  BP(mean): 112 (31 Aug 2024 23:03) (112 - 114)  RR: 18 (01 Sep 2024 13:10) (16 - 24)  SpO2: 96% (01 Sep 2024 13:10) (96% - 99%)    Parameters below as of 01 Sep 2024 13:10  Patient On (Oxygen Delivery Method): room air      CAPILLARY BLOOD GLUCOSE      POCT Blood Glucose.: 134 mg/dL (01 Sep 2024 13:17)  POCT Blood Glucose.: 120 mg/dL (01 Sep 2024 07:50)    I&O's Summary    31 Aug 2024 07:01  -  01 Sep 2024 07:00  --------------------------------------------------------  IN: 0 mL / OUT: 800 mL / NET: -800 mL    01 Sep 2024 07:01  -  01 Sep 2024 15:21  --------------------------------------------------------  IN: 27 mL / OUT: 300 mL / NET: -273 mL        PHYSICAL EXAM  GENERAL: NAD, lying comfortably in bed   HEENT:  Atraumatic, Normocephalic, EOMI, conjunctiva and sclera clear, no LAD  CHEST/LUNG: Clear to auscultation bilaterally; No wheeze  HEART: RRR, S1 and S2 No murmurs, rubs, or gallops  ABDOMEN: Soft, Nontender, Nondistended; Bowel sounds present  EXTREMITIES:  2+ Peripheral Pulses, No clubbing, cyanosis, or edema  NEURO: AAOx3, non-focal  SKIN: No rashes or lesions    LABS:                        13.6   6.22  )-----------( 255      ( 01 Sep 2024 05:58 )             39.9     09-01    141  |  107  |  11  ----------------------------<  111<H>  3.8   |  23  |  0.92    Ca    9.1      01 Sep 2024 05:58  Phos  3.0     09-01  Mg     2.1     09-01    TPro  6.7  /  Alb  3.9  /  TBili  0.7  /  DBili  x   /  AST  25  /  ALT  28  /  AlkPhos  42  08-31    PT/INR - ( 01 Sep 2024 05:58 )   PT: 11.3 sec;   INR: 1.03 ratio         PTT - ( 01 Sep 2024 05:58 )  PTT:76.6 sec  CARDIAC MARKERS ( 01 Sep 2024 05:58 )  x     / x     / x     / x     / 8.7 ng/mL  CARDIAC MARKERS ( 31 Aug 2024 20:09 )  x     / x     / x     / x     / 10.6 ng/mL      Urinalysis Basic - ( 01 Sep 2024 05:58 )    Color: x / Appearance: x / SG: x / pH: x  Gluc: 111 mg/dL / Ketone: x  / Bili: x / Urobili: x   Blood: x / Protein: x / Nitrite: x   Leuk Esterase: x / RBC: x / WBC x   Sq Epi: x / Non Sq Epi: x / Bacteria: x          RADIOLOGY & ADDITIONAL TESTS:  < from: Cardiac Catheterization (09.01.24 @ 11:40) >  Coronary Angiography   The coronary circulation is right dominant.      LAD   Mid left anterior descending: There is a 20 % stenosis.      CX   Proximal circumflex: There is a 15 % stenosis. First obtuse marginal:  The segment is visually normal in size and structure.  Second obtuse marginal: Thesegment is visually normal in size and  structure.    RCA   Proximal right coronary artery: There is a 20 % stenosis. Distal right  coronary artery: There is a 10 % stenosis.      < end of copied text >      Labs Personally Reviewed  Imaging Personally Reviewed  Consultant(s) Notes Reviewed

## 2024-09-01 NOTE — DISCHARGE NOTE PROVIDER - NSDCFUADDAPPT_GEN_ALL_CORE_FT
APPTS ARE READY TO BE MADE: [X] YES    Best Family or Patient Contact (if needed):    Additional Information about above appointments (if needed):    1: PCP (none yet)  2: Cardiology (if patient does not have cardiologist, can go with Dr. Quinones)  3:  APPTS ARE READY TO BE MADE: [X] YES    Best Family or Patient Contact (if needed):    Additional Information about above appointments (if needed):    1: PCP (none yet)  2: Cardiology (if patient does not have cardiologist, can go with Dr. Quinones)  3:     Provided patient with provider referral information, however patient prefers to schedule the appointments on their own.

## 2024-09-01 NOTE — DISCHARGE NOTE PROVIDER - CARE PROVIDER_API CALL
Rafael Quinones  Cardiology  [address], [address]  [place] [address]  Phone: [phone]  Fax: [phone]  Follow Up Time: 2 weeks

## 2024-09-01 NOTE — CHART NOTE - NSCHARTNOTEFT_GEN_A_CORE
Patient is seen and evaluated at bedside, is s/p [facility] via [facility] access. Patient denies pain, active chest pain, fevers, chills, N/V, CP or SOB, abdominal pain, headache, dizziness, or chills.      Physical Exam  General: Well developed, well groomed younger male resting comfortably in bed  CV: +S1, S2, Regular Rate and Rhythm  Pulm: Clear to auscultation bilateral lung fields  GI: +BS in all 4 quadrants, abdomen soft, non-tender, non-distended  Ext: 2+ radial pulses bilateral upper extremities, 2+ femoral pulses bilateral lower extremities; cath site clean, dry and intact, absent active bleeding. Mild pain noted to right radial artery cath site upon light palpation, no evidence of hematoma; scattered ecchymoses absent expansion.    Plan:  - Physical Exam as above  - Monitor pulses and site checks; monitor for continued/worsened pain  - Endorsed to RN  - Will continue to monitor every 6 hours.   - Will endorse to PM team    Denise Richardson PA-C  Department of Medicine  Spectralink #[id number]

## 2024-09-01 NOTE — DISCHARGE NOTE PROVIDER - NSDCMRMEDTOKEN_GEN_ALL_CORE_FT
acetaminophen 325 mg oral tablet: 2 tab(s) orally every 6 hours As needed Temp greater or equal to 38C (100.4F), Mild Pain (1 - 3)  aspirin 81 mg oral delayed release tablet: 1 tab(s) orally once a day  atorvastatin 40 mg oral tablet: 1 tab(s) orally once a day (at bedtime)  lisinopril-hydrochlorothiazide 10 mg-12.5 mg oral tablet: 1 tab(s) orally once a day  pantoprazole 20 mg oral delayed release tablet: 1 tab(s) orally once a day

## 2024-09-02 ENCOUNTER — TRANSCRIPTION ENCOUNTER (OUTPATIENT)
Age: 49
End: 2024-09-02

## 2024-09-02 VITALS — WEIGHT: 207.23 LBS

## 2024-09-02 LAB
GLUCOSE BLDC GLUCOMTR-MCNC: 109 MG/DL — HIGH (ref 70–99)
T4 FREE SERPL-MCNC: 1.7 NG/DL — SIGNIFICANT CHANGE UP (ref 0.9–1.8)

## 2024-09-02 PROCEDURE — 80048 BASIC METABOLIC PNL TOTAL CA: CPT

## 2024-09-02 PROCEDURE — 85025 COMPLETE CBC W/AUTO DIFF WBC: CPT

## 2024-09-02 PROCEDURE — C1769: CPT

## 2024-09-02 PROCEDURE — 84484 ASSAY OF TROPONIN QUANT: CPT

## 2024-09-02 PROCEDURE — 99285 EMERGENCY DEPT VISIT HI MDM: CPT

## 2024-09-02 PROCEDURE — C1894: CPT

## 2024-09-02 PROCEDURE — 83036 HEMOGLOBIN GLYCOSYLATED A1C: CPT

## 2024-09-02 PROCEDURE — C1887: CPT

## 2024-09-02 PROCEDURE — 82550 ASSAY OF CK (CPK): CPT

## 2024-09-02 PROCEDURE — 85610 PROTHROMBIN TIME: CPT

## 2024-09-02 PROCEDURE — 80061 LIPID PANEL: CPT

## 2024-09-02 PROCEDURE — 71045 X-RAY EXAM CHEST 1 VIEW: CPT

## 2024-09-02 PROCEDURE — 80053 COMPREHEN METABOLIC PANEL: CPT

## 2024-09-02 PROCEDURE — 84439 ASSAY OF FREE THYROXINE: CPT

## 2024-09-02 PROCEDURE — 85730 THROMBOPLASTIN TIME PARTIAL: CPT

## 2024-09-02 PROCEDURE — C8929: CPT

## 2024-09-02 PROCEDURE — 84100 ASSAY OF PHOSPHORUS: CPT

## 2024-09-02 PROCEDURE — 82962 GLUCOSE BLOOD TEST: CPT

## 2024-09-02 PROCEDURE — 83880 ASSAY OF NATRIURETIC PEPTIDE: CPT

## 2024-09-02 PROCEDURE — 93458 L HRT ARTERY/VENTRICLE ANGIO: CPT

## 2024-09-02 PROCEDURE — 99239 HOSP IP/OBS DSCHRG MGMT >30: CPT

## 2024-09-02 PROCEDURE — 93005 ELECTROCARDIOGRAM TRACING: CPT

## 2024-09-02 PROCEDURE — 84443 ASSAY THYROID STIM HORMONE: CPT

## 2024-09-02 PROCEDURE — 83735 ASSAY OF MAGNESIUM: CPT

## 2024-09-02 PROCEDURE — 82553 CREATINE MB FRACTION: CPT

## 2024-09-02 RX ADMIN — LISINOPRIL 10 MILLIGRAM(S): 10 TABLET ORAL at 05:49

## 2024-09-02 RX ADMIN — Medication 40 MILLIGRAM(S): at 05:49

## 2024-09-02 RX ADMIN — Medication 81 MILLIGRAM(S): at 10:29

## 2024-09-02 NOTE — DISCHARGE NOTE NURSING/CASE MANAGEMENT/SOCIAL WORK - NSDCFUADDAPPT_GEN_ALL_CORE_FT
APPTS ARE READY TO BE MADE: [X] YES    Best Family or Patient Contact (if needed):    Additional Information about above appointments (if needed):    1: PCP (none yet)  2: Cardiology (if patient does not have cardiologist, can go with Dr. Quinones)  3:

## 2024-09-02 NOTE — DISCHARGE NOTE NURSING/CASE MANAGEMENT/SOCIAL WORK - PATIENT PORTAL LINK FT
You can access the FollowMyHealth Patient Portal offered by [facility] by registering at the following website: [email or web address]. By joining [facility]’s FollowMyHealth portal, you will also be able to view your health information using other applications (apps) compatible with our system.

## 2024-09-19 ENCOUNTER — TRANSCRIPTION ENCOUNTER (OUTPATIENT)
Age: 49
End: 2024-09-19

## 2024-09-19 ENCOUNTER — INPATIENT (INPATIENT)
Facility: HOSPITAL | Age: 49
LOS: 1 days | Discharge: HOME | End: 2024-09-21
Attending: SURGERY | Admitting: SURGERY
Payer: MEDICAID

## 2024-09-19 VITALS
SYSTOLIC BLOOD PRESSURE: 160 MMHG | RESPIRATION RATE: 18 BRPM | WEIGHT: 199.96 LBS | HEART RATE: 115 BPM | TEMPERATURE: 99 F | DIASTOLIC BLOOD PRESSURE: 117 MMHG | OXYGEN SATURATION: 98 % | HEIGHT: 66 IN

## 2024-09-19 DIAGNOSIS — K40.90 UNILATERAL INGUINAL HERNIA, WITHOUT OBSTRUCTION OR GANGRENE, NOT SPECIFIED AS RECURRENT: Chronic | ICD-10-CM

## 2024-09-19 DIAGNOSIS — Z98.890 OTHER SPECIFIED POSTPROCEDURAL STATES: Chronic | ICD-10-CM

## 2024-09-19 LAB
ALBUMIN SERPL ELPH-MCNC: 4.3 G/DL — SIGNIFICANT CHANGE UP (ref 3.3–5)
ALP SERPL-CCNC: 51 U/L — SIGNIFICANT CHANGE UP (ref 40–120)
ALT FLD-CCNC: 27 U/L — SIGNIFICANT CHANGE UP (ref 4–41)
ANION GAP SERPL CALC-SCNC: 15 MMOL/L — HIGH (ref 7–14)
APTT BLD: 35.8 SEC — HIGH (ref 24.5–35.6)
AST SERPL-CCNC: 19 U/L — SIGNIFICANT CHANGE UP (ref 4–40)
BASOPHILS # BLD AUTO: 0.03 K/UL — SIGNIFICANT CHANGE UP (ref 0–0.2)
BASOPHILS NFR BLD AUTO: 0.2 % — SIGNIFICANT CHANGE UP (ref 0–2)
BILIRUB SERPL-MCNC: 1.1 MG/DL — SIGNIFICANT CHANGE UP (ref 0.2–1.2)
BUN SERPL-MCNC: 11 MG/DL — SIGNIFICANT CHANGE UP (ref 7–23)
CALCIUM SERPL-MCNC: 9.8 MG/DL — SIGNIFICANT CHANGE UP (ref 8.4–10.5)
CHLORIDE SERPL-SCNC: 99 MMOL/L — SIGNIFICANT CHANGE UP (ref 98–107)
CO2 SERPL-SCNC: 22 MMOL/L — SIGNIFICANT CHANGE UP (ref 22–31)
CREAT SERPL-MCNC: 0.96 MG/DL — SIGNIFICANT CHANGE UP (ref 0.5–1.3)
EGFR: 96 ML/MIN/1.73M2 — SIGNIFICANT CHANGE UP
EGFR: 96 ML/MIN/1.73M2 — SIGNIFICANT CHANGE UP
EOSINOPHIL # BLD AUTO: 0.07 K/UL — SIGNIFICANT CHANGE UP (ref 0–0.5)
EOSINOPHIL NFR BLD AUTO: 0.5 % — SIGNIFICANT CHANGE UP (ref 0–6)
GAS PNL BLDV: SIGNIFICANT CHANGE UP
GLUCOSE SERPL-MCNC: 87 MG/DL — SIGNIFICANT CHANGE UP (ref 70–99)
HCT VFR BLD CALC: 40.6 % — SIGNIFICANT CHANGE UP (ref 39–50)
HGB BLD-MCNC: 14 G/DL — SIGNIFICANT CHANGE UP (ref 13–17)
IANC: 10.67 K/UL — HIGH (ref 1.8–7.4)
IMM GRANULOCYTES NFR BLD AUTO: 0.4 % — SIGNIFICANT CHANGE UP (ref 0–0.9)
INR BLD: 1.14 RATIO — SIGNIFICANT CHANGE UP (ref 0.85–1.18)
LIDOCAIN IGE QN: 33 U/L — SIGNIFICANT CHANGE UP (ref 7–60)
LYMPHOCYTES # BLD AUTO: 1.46 K/UL — SIGNIFICANT CHANGE UP (ref 1–3.3)
LYMPHOCYTES # BLD AUTO: 10.9 % — LOW (ref 13–44)
MCHC RBC-ENTMCNC: 28.3 PG — SIGNIFICANT CHANGE UP (ref 27–34)
MCHC RBC-ENTMCNC: 34.5 GM/DL — SIGNIFICANT CHANGE UP (ref 32–36)
MCV RBC AUTO: 82 FL — SIGNIFICANT CHANGE UP (ref 80–100)
MONOCYTES # BLD AUTO: 1.1 K/UL — HIGH (ref 0–0.9)
MONOCYTES NFR BLD AUTO: 8.2 % — SIGNIFICANT CHANGE UP (ref 2–14)
NEUTROPHILS # BLD AUTO: 10.67 K/UL — HIGH (ref 1.8–7.4)
NEUTROPHILS NFR BLD AUTO: 79.8 % — HIGH (ref 43–77)
NRBC # BLD AUTO: 0 K/UL — SIGNIFICANT CHANGE UP (ref 0–0)
NRBC # BLD: 0 /100 WBCS — SIGNIFICANT CHANGE UP (ref 0–0)
NRBC # FLD: 0 K/UL — SIGNIFICANT CHANGE UP (ref 0–0)
NRBC BLD-RTO: 0 /100 WBCS — SIGNIFICANT CHANGE UP (ref 0–0)
PLATELET # BLD AUTO: 319 K/UL — SIGNIFICANT CHANGE UP (ref 150–400)
POTASSIUM SERPL-MCNC: 3.6 MMOL/L — SIGNIFICANT CHANGE UP (ref 3.5–5.3)
POTASSIUM SERPL-SCNC: 3.6 MMOL/L — SIGNIFICANT CHANGE UP (ref 3.5–5.3)
PROT SERPL-MCNC: 8.2 G/DL — SIGNIFICANT CHANGE UP (ref 6–8.3)
PROTHROM AB SERPL-ACNC: 12.7 SEC — SIGNIFICANT CHANGE UP (ref 9.5–13)
RBC # BLD: 4.95 M/UL — SIGNIFICANT CHANGE UP (ref 4.2–5.8)
RBC # FLD: 13.2 % — SIGNIFICANT CHANGE UP (ref 10.3–14.5)
SODIUM SERPL-SCNC: 136 MMOL/L — SIGNIFICANT CHANGE UP (ref 135–145)
WBC # BLD: 13.39 K/UL — HIGH (ref 3.8–10.5)
WBC # FLD AUTO: 13.39 K/UL — HIGH (ref 3.8–10.5)

## 2024-09-19 PROCEDURE — 76705 ECHO EXAM OF ABDOMEN: CPT | Mod: 26

## 2024-09-19 PROCEDURE — 99285 EMERGENCY DEPT VISIT HI MDM: CPT

## 2024-09-19 RX ORDER — ACETAMINOPHEN 500 MG/5ML
1000 LIQUID (ML) ORAL ONCE
Refills: 0 | Status: COMPLETED | OUTPATIENT
Start: 2024-09-19 | End: 2024-09-19

## 2024-09-19 RX ORDER — KETOROLAC TROMETHAMINE 30 MG/ML
15 INJECTION, SOLUTION INTRAMUSCULAR; INTRAVENOUS ONCE
Refills: 0 | Status: DISCONTINUED | OUTPATIENT
Start: 2024-09-19 | End: 2024-09-19

## 2024-09-19 RX ORDER — PIPERACILLIN-TAZO-DEXTROSE,ISO 3.375G/5
3.38 IV SOLUTION, PIGGYBACK PREMIX FROZEN(ML) INTRAVENOUS ONCE
Refills: 0 | Status: COMPLETED | OUTPATIENT
Start: 2024-09-19 | End: 2024-09-19

## 2024-09-19 RX ADMIN — KETOROLAC TROMETHAMINE 15 MILLIGRAM(S): 30 INJECTION, SOLUTION INTRAMUSCULAR; INTRAVENOUS at 22:40

## 2024-09-19 RX ADMIN — Medication 400 MILLIGRAM(S): at 22:40

## 2024-09-20 ENCOUNTER — RESULT REVIEW (OUTPATIENT)
Age: 49
End: 2024-09-20

## 2024-09-20 DIAGNOSIS — K81.0 ACUTE CHOLECYSTITIS: ICD-10-CM

## 2024-09-20 LAB
ALBUMIN SERPL ELPH-MCNC: 3.9 G/DL — SIGNIFICANT CHANGE UP (ref 3.3–5)
ALP SERPL-CCNC: 45 U/L — SIGNIFICANT CHANGE UP (ref 40–120)
ALT FLD-CCNC: 27 U/L — SIGNIFICANT CHANGE UP (ref 4–41)
ANION GAP SERPL CALC-SCNC: 14 MMOL/L — SIGNIFICANT CHANGE UP (ref 7–14)
APTT BLD: 35.8 SEC — HIGH (ref 24.5–35.6)
AST SERPL-CCNC: 17 U/L — SIGNIFICANT CHANGE UP (ref 4–40)
BILIRUB SERPL-MCNC: 1 MG/DL — SIGNIFICANT CHANGE UP (ref 0.2–1.2)
BLD GP AB SCN SERPL QL: NEGATIVE — SIGNIFICANT CHANGE UP
BLD GP AB SCN SERPL QL: NEGATIVE — SIGNIFICANT CHANGE UP
BUN SERPL-MCNC: 14 MG/DL — SIGNIFICANT CHANGE UP (ref 7–23)
CALCIUM SERPL-MCNC: 8.9 MG/DL — SIGNIFICANT CHANGE UP (ref 8.4–10.5)
CHLORIDE SERPL-SCNC: 101 MMOL/L — SIGNIFICANT CHANGE UP (ref 98–107)
CO2 SERPL-SCNC: 22 MMOL/L — SIGNIFICANT CHANGE UP (ref 22–31)
CREAT SERPL-MCNC: 0.91 MG/DL — SIGNIFICANT CHANGE UP (ref 0.5–1.3)
EGFR: 103 ML/MIN/1.73M2 — SIGNIFICANT CHANGE UP
EGFR: 103 ML/MIN/1.73M2 — SIGNIFICANT CHANGE UP
GLUCOSE BLDC GLUCOMTR-MCNC: 111 MG/DL — HIGH (ref 70–99)
GLUCOSE BLDC GLUCOMTR-MCNC: 195 MG/DL — HIGH (ref 70–99)
GLUCOSE BLDC GLUCOMTR-MCNC: 80 MG/DL — SIGNIFICANT CHANGE UP (ref 70–99)
GLUCOSE BLDC GLUCOMTR-MCNC: 88 MG/DL — SIGNIFICANT CHANGE UP (ref 70–99)
GLUCOSE BLDC GLUCOMTR-MCNC: 91 MG/DL — SIGNIFICANT CHANGE UP (ref 70–99)
GLUCOSE BLDC GLUCOMTR-MCNC: 92 MG/DL — SIGNIFICANT CHANGE UP (ref 70–99)
GLUCOSE SERPL-MCNC: 82 MG/DL — SIGNIFICANT CHANGE UP (ref 70–99)
HCT VFR BLD CALC: 39.3 % — SIGNIFICANT CHANGE UP (ref 39–50)
HGB BLD-MCNC: 13.4 G/DL — SIGNIFICANT CHANGE UP (ref 13–17)
INR BLD: 1.21 RATIO — HIGH (ref 0.85–1.18)
MAGNESIUM SERPL-MCNC: 2.1 MG/DL — SIGNIFICANT CHANGE UP (ref 1.6–2.6)
MCHC RBC-ENTMCNC: 28 PG — SIGNIFICANT CHANGE UP (ref 27–34)
MCHC RBC-ENTMCNC: 34.1 GM/DL — SIGNIFICANT CHANGE UP (ref 32–36)
MCV RBC AUTO: 82 FL — SIGNIFICANT CHANGE UP (ref 80–100)
NRBC # BLD AUTO: 0 K/UL — SIGNIFICANT CHANGE UP (ref 0–0)
NRBC # BLD: 0 /100 WBCS — SIGNIFICANT CHANGE UP (ref 0–0)
NRBC # FLD: 0 K/UL — SIGNIFICANT CHANGE UP (ref 0–0)
NRBC BLD-RTO: 0 /100 WBCS — SIGNIFICANT CHANGE UP (ref 0–0)
PHOSPHATE SERPL-MCNC: 2.9 MG/DL — SIGNIFICANT CHANGE UP (ref 2.5–4.5)
PLATELET # BLD AUTO: 283 K/UL — SIGNIFICANT CHANGE UP (ref 150–400)
POTASSIUM SERPL-MCNC: 3.2 MMOL/L — LOW (ref 3.5–5.3)
POTASSIUM SERPL-SCNC: 3.2 MMOL/L — LOW (ref 3.5–5.3)
PROT SERPL-MCNC: 7.5 G/DL — SIGNIFICANT CHANGE UP (ref 6–8.3)
PROTHROM AB SERPL-ACNC: 13.6 SEC — HIGH (ref 9.5–13)
RBC # BLD: 4.79 M/UL — SIGNIFICANT CHANGE UP (ref 4.2–5.8)
RBC # FLD: 13.2 % — SIGNIFICANT CHANGE UP (ref 10.3–14.5)
RH IG SCN BLD-IMP: POSITIVE — SIGNIFICANT CHANGE UP
RH IG SCN BLD-IMP: POSITIVE — SIGNIFICANT CHANGE UP
SODIUM SERPL-SCNC: 137 MMOL/L — SIGNIFICANT CHANGE UP (ref 135–145)
WBC # BLD: 11.6 K/UL — HIGH (ref 3.8–10.5)
WBC # FLD AUTO: 11.6 K/UL — HIGH (ref 3.8–10.5)

## 2024-09-20 PROCEDURE — 99222 1ST HOSP IP/OBS MODERATE 55: CPT | Mod: 25,57,GC

## 2024-09-20 PROCEDURE — 47562 LAPAROSCOPIC CHOLECYSTECTOMY: CPT

## 2024-09-20 PROCEDURE — 88304 TISSUE EXAM BY PATHOLOGIST: CPT | Mod: 26

## 2024-09-20 DEVICE — LIGATING CLIPS WECK HEMOLOK POLYMER MEDIUM-LARGE (GREEN) 6: Type: IMPLANTABLE DEVICE | Status: FUNCTIONAL

## 2024-09-20 RX ORDER — INSULIN LISPRO 100 U/ML
INJECTION, SOLUTION INTRAVENOUS; SUBCUTANEOUS
Refills: 0 | Status: DISCONTINUED | OUTPATIENT
Start: 2024-09-20 | End: 2024-09-21

## 2024-09-20 RX ORDER — FENOFIBRATE 160 MG/1
48 TABLET ORAL AT BEDTIME
Refills: 0 | Status: DISCONTINUED | OUTPATIENT
Start: 2024-09-20 | End: 2024-09-21

## 2024-09-20 RX ORDER — GLUCAGON 3 MG/1
1 POWDER NASAL ONCE
Refills: 0 | Status: DISCONTINUED | OUTPATIENT
Start: 2024-09-20 | End: 2024-09-20

## 2024-09-20 RX ORDER — DEXTROSE 50 % IN WATER 50 %
12.5 SYRINGE (ML) INTRAVENOUS ONCE
Refills: 0 | Status: DISCONTINUED | OUTPATIENT
Start: 2024-09-20 | End: 2024-09-21

## 2024-09-20 RX ORDER — INFLUENZA A VIRUS A/IDAHO/07/2018 (H1N1) ANTIGEN (MDCK CELL DERIVED, PROPIOLACTONE INACTIVATED, INFLUENZA A VIRUS A/INDIANA/08/2018 (H3N2) ANTIGEN (MDCK CELL DERIVED, PROPIOLACTONE INACTIVATED), INFLUENZA B VIRUS B/SINGAPORE/INFTT-16-0610/2016 ANTIGEN (MDCK CELL DERIVED, PROPIOLACTONE INACTIVATED), INFLUENZA B VIRUS B/IOWA/06/2017 ANTIGEN (MDCK CELL DERIVED, PROPIOLACTONE INACTIVATED) 15; 15; 15; 15 UG/.5ML; UG/.5ML; UG/.5ML; UG/.5ML
0.5 INJECTION, SUSPENSION INTRAMUSCULAR ONCE
Refills: 0 | Status: DISCONTINUED | OUTPATIENT
Start: 2024-09-20 | End: 2024-09-21

## 2024-09-20 RX ORDER — HYDROMORPHONE/SOD CHLOR,ISO/PF 2 MG/10 ML
0.5 SYRINGE (ML) INJECTION
Refills: 0 | Status: DISCONTINUED | OUTPATIENT
Start: 2024-09-20 | End: 2024-09-20

## 2024-09-20 RX ORDER — GLUCAGON 3 MG/1
1 POWDER NASAL ONCE
Refills: 0 | Status: DISCONTINUED | OUTPATIENT
Start: 2024-09-20 | End: 2024-09-21

## 2024-09-20 RX ORDER — DEXTROSE 50 % IN WATER 50 %
25 SYRINGE (ML) INTRAVENOUS ONCE
Refills: 0 | Status: DISCONTINUED | OUTPATIENT
Start: 2024-09-20 | End: 2024-09-20

## 2024-09-20 RX ORDER — ACETAMINOPHEN 500 MG/5ML
1000 LIQUID (ML) ORAL EVERY 6 HOURS
Refills: 0 | Status: COMPLETED | OUTPATIENT
Start: 2024-09-20 | End: 2024-09-20

## 2024-09-20 RX ORDER — SODIUM CHLORIDE 9 G/1000ML
1000 INJECTION, SOLUTION INTRAVENOUS
Refills: 0 | Status: DISCONTINUED | OUTPATIENT
Start: 2024-09-20 | End: 2024-09-20

## 2024-09-20 RX ORDER — DEXTROSE 50 % IN WATER 50 %
12.5 SYRINGE (ML) INTRAVENOUS ONCE
Refills: 0 | Status: DISCONTINUED | OUTPATIENT
Start: 2024-09-20 | End: 2024-09-20

## 2024-09-20 RX ORDER — OXYCODONE HYDROCHLORIDE 30 MG/1
1 TABLET ORAL
Qty: 8 | Refills: 0
Start: 2024-09-20

## 2024-09-20 RX ORDER — INSULIN LISPRO 100 U/ML
INJECTION, SOLUTION INTRAVENOUS; SUBCUTANEOUS AT BEDTIME
Refills: 0 | Status: DISCONTINUED | OUTPATIENT
Start: 2024-09-20 | End: 2024-09-21

## 2024-09-20 RX ORDER — INSULIN LISPRO 100 U/ML
INJECTION, SOLUTION INTRAVENOUS; SUBCUTANEOUS
Refills: 0 | Status: DISCONTINUED | OUTPATIENT
Start: 2024-09-20 | End: 2024-09-20

## 2024-09-20 RX ORDER — DEXTROSE 50 % IN WATER 50 %
25 SYRINGE (ML) INTRAVENOUS ONCE
Refills: 0 | Status: DISCONTINUED | OUTPATIENT
Start: 2024-09-20 | End: 2024-09-21

## 2024-09-20 RX ORDER — HEPARIN SODIUM 1000 [USP'U]/ML
5000 INJECTION INTRAVENOUS; SUBCUTANEOUS EVERY 8 HOURS
Refills: 0 | Status: DISCONTINUED | OUTPATIENT
Start: 2024-09-20 | End: 2024-09-21

## 2024-09-20 RX ORDER — ASPIRIN 325 MG
0 TABLET ORAL
Qty: 0 | Refills: 0 | DISCHARGE

## 2024-09-20 RX ORDER — LOVASTATIN 20 MG/1
0 TABLET ORAL
Qty: 0 | Refills: 0 | DISCHARGE

## 2024-09-20 RX ORDER — SODIUM CHLORIDE 9 G/1000ML
1000 INJECTION, SOLUTION INTRAVENOUS
Refills: 0 | Status: DISCONTINUED | OUTPATIENT
Start: 2024-09-20 | End: 2024-09-21

## 2024-09-20 RX ORDER — ASPIRIN 325 MG
81 TABLET ORAL DAILY
Refills: 0 | Status: DISCONTINUED | OUTPATIENT
Start: 2024-09-20 | End: 2024-09-20

## 2024-09-20 RX ORDER — INSULIN LISPRO 100 U/ML
INJECTION, SOLUTION INTRAVENOUS; SUBCUTANEOUS AT BEDTIME
Refills: 0 | Status: DISCONTINUED | OUTPATIENT
Start: 2024-09-20 | End: 2024-09-20

## 2024-09-20 RX ORDER — DEXTROSE 50 % IN WATER 50 %
15 SYRINGE (ML) INTRAVENOUS ONCE
Refills: 0 | Status: DISCONTINUED | OUTPATIENT
Start: 2024-09-20 | End: 2024-09-20

## 2024-09-20 RX ORDER — DEXTROSE 50 % IN WATER 50 %
15 SYRINGE (ML) INTRAVENOUS ONCE
Refills: 0 | Status: DISCONTINUED | OUTPATIENT
Start: 2024-09-20 | End: 2024-09-21

## 2024-09-20 RX ORDER — LISINOPRIL 5 MG/1
10 TABLET ORAL DAILY
Refills: 0 | Status: DISCONTINUED | OUTPATIENT
Start: 2024-09-20 | End: 2024-09-21

## 2024-09-20 RX ORDER — ONDANSETRON HCL/PF 4 MG/2 ML
4 VIAL (ML) INJECTION ONCE
Refills: 0 | Status: DISCONTINUED | OUTPATIENT
Start: 2024-09-20 | End: 2024-09-20

## 2024-09-20 RX ORDER — INSULIN LISPRO 100 U/ML
INJECTION, SOLUTION INTRAVENOUS; SUBCUTANEOUS EVERY 6 HOURS
Refills: 0 | Status: DISCONTINUED | OUTPATIENT
Start: 2024-09-20 | End: 2024-09-20

## 2024-09-20 RX ORDER — ASPIRIN 325 MG
1 TABLET ORAL
Refills: 0 | DISCHARGE

## 2024-09-20 RX ORDER — LISINOPRIL AND HYDROCHLOROTHIAZIDE 12.5; 2 MG/1; MG/1
0 TABLET ORAL
Qty: 0 | Refills: 0 | DISCHARGE

## 2024-09-20 RX ORDER — ATORVASTATIN CALCIUM 80 MG/1
40 TABLET, FILM COATED ORAL AT BEDTIME
Refills: 0 | Status: DISCONTINUED | OUTPATIENT
Start: 2024-09-20 | End: 2024-09-21

## 2024-09-20 RX ORDER — METFORMIN HYDROCHLORIDE 850 MG/1
0 TABLET ORAL
Qty: 0 | Refills: 0 | DISCHARGE

## 2024-09-20 RX ORDER — PIPERACILLIN-TAZO-DEXTROSE,ISO 3.375G/5
3.38 IV SOLUTION, PIGGYBACK PREMIX FROZEN(ML) INTRAVENOUS EVERY 8 HOURS
Refills: 0 | Status: DISCONTINUED | OUTPATIENT
Start: 2024-09-20 | End: 2024-09-21

## 2024-09-20 RX ADMIN — Medication 40 MILLIGRAM(S): at 05:57

## 2024-09-20 RX ADMIN — KETOROLAC TROMETHAMINE 15 MILLIGRAM(S): 30 INJECTION, SOLUTION INTRAMUSCULAR; INTRAVENOUS at 00:00

## 2024-09-20 RX ADMIN — Medication 400 MILLIGRAM(S): at 23:22

## 2024-09-20 RX ADMIN — HEPARIN SODIUM 5000 UNIT(S): 1000 INJECTION INTRAVENOUS; SUBCUTANEOUS at 05:53

## 2024-09-20 RX ADMIN — Medication 1000 MILLIGRAM(S): at 00:00

## 2024-09-20 RX ADMIN — HEPARIN SODIUM 5000 UNIT(S): 1000 INJECTION INTRAVENOUS; SUBCUTANEOUS at 22:40

## 2024-09-20 RX ADMIN — Medication 1000 MILLILITER(S): at 01:08

## 2024-09-20 RX ADMIN — Medication 100 MILLIEQUIVALENT(S): at 08:01

## 2024-09-20 RX ADMIN — Medication 1000 MILLIGRAM(S): at 11:25

## 2024-09-20 RX ADMIN — Medication 25 GRAM(S): at 05:52

## 2024-09-20 RX ADMIN — Medication 120 MILLILITER(S): at 05:52

## 2024-09-20 RX ADMIN — Medication 200 GRAM(S): at 00:12

## 2024-09-20 RX ADMIN — Medication 400 MILLIGRAM(S): at 05:52

## 2024-09-20 RX ADMIN — ATORVASTATIN CALCIUM 40 MILLIGRAM(S): 80 TABLET, FILM COATED ORAL at 22:40

## 2024-09-20 RX ADMIN — HEPARIN SODIUM 5000 UNIT(S): 1000 INJECTION INTRAVENOUS; SUBCUTANEOUS at 13:02

## 2024-09-20 RX ADMIN — Medication 100 MILLIEQUIVALENT(S): at 06:57

## 2024-09-20 RX ADMIN — Medication 400 MILLIGRAM(S): at 11:10

## 2024-09-20 RX ADMIN — FENOFIBRATE 48 MILLIGRAM(S): 160 TABLET ORAL at 23:21

## 2024-09-20 RX ADMIN — SODIUM CHLORIDE 100 MILLILITER(S): 9 INJECTION, SOLUTION INTRAVENOUS at 13:02

## 2024-09-20 RX ADMIN — LISINOPRIL 10 MILLIGRAM(S): 5 TABLET ORAL at 05:53

## 2024-09-20 RX ADMIN — Medication 25 GRAM(S): at 22:40

## 2024-09-20 RX ADMIN — Medication 25 GRAM(S): at 13:03

## 2024-09-20 RX ADMIN — Medication 100 MILLIEQUIVALENT(S): at 10:31

## 2024-09-20 RX ADMIN — Medication 1000 MILLIGRAM(S): at 06:15

## 2024-09-21 ENCOUNTER — TRANSCRIPTION ENCOUNTER (OUTPATIENT)
Age: 49
End: 2024-09-21

## 2024-09-21 VITALS
DIASTOLIC BLOOD PRESSURE: 90 MMHG | OXYGEN SATURATION: 97 % | HEART RATE: 100 BPM | SYSTOLIC BLOOD PRESSURE: 131 MMHG | RESPIRATION RATE: 18 BRPM | TEMPERATURE: 98 F

## 2024-09-21 LAB
A1C WITH ESTIMATED AVERAGE GLUCOSE RESULT: 5.9 % — HIGH (ref 4–5.6)
ALBUMIN SERPL ELPH-MCNC: 4.1 G/DL — SIGNIFICANT CHANGE UP (ref 3.3–5)
ALP SERPL-CCNC: 56 U/L — SIGNIFICANT CHANGE UP (ref 40–120)
ALT FLD-CCNC: 63 U/L — HIGH (ref 4–41)
ANION GAP SERPL CALC-SCNC: 17 MMOL/L — HIGH (ref 7–14)
AST SERPL-CCNC: 58 U/L — HIGH (ref 4–40)
BILIRUB SERPL-MCNC: 0.7 MG/DL — SIGNIFICANT CHANGE UP (ref 0.2–1.2)
BUN SERPL-MCNC: 10 MG/DL — SIGNIFICANT CHANGE UP (ref 7–23)
CALCIUM SERPL-MCNC: 9.5 MG/DL — SIGNIFICANT CHANGE UP (ref 8.4–10.5)
CHLORIDE SERPL-SCNC: 101 MMOL/L — SIGNIFICANT CHANGE UP (ref 98–107)
CO2 SERPL-SCNC: 21 MMOL/L — LOW (ref 22–31)
CREAT SERPL-MCNC: 0.79 MG/DL — SIGNIFICANT CHANGE UP (ref 0.5–1.3)
EGFR: 108 ML/MIN/1.73M2 — SIGNIFICANT CHANGE UP
EGFR: 108 ML/MIN/1.73M2 — SIGNIFICANT CHANGE UP
ESTIMATED AVERAGE GLUCOSE: 123 — SIGNIFICANT CHANGE UP
GLUCOSE BLDC GLUCOMTR-MCNC: 100 MG/DL — HIGH (ref 70–99)
GLUCOSE BLDC GLUCOMTR-MCNC: 116 MG/DL — HIGH (ref 70–99)
GLUCOSE SERPL-MCNC: 101 MG/DL — HIGH (ref 70–99)
HCT VFR BLD CALC: 39 % — SIGNIFICANT CHANGE UP (ref 39–50)
HGB BLD-MCNC: 13.6 G/DL — SIGNIFICANT CHANGE UP (ref 13–17)
MAGNESIUM SERPL-MCNC: 2.5 MG/DL — SIGNIFICANT CHANGE UP (ref 1.6–2.6)
MCHC RBC-ENTMCNC: 28.4 PG — SIGNIFICANT CHANGE UP (ref 27–34)
MCHC RBC-ENTMCNC: 34.9 GM/DL — SIGNIFICANT CHANGE UP (ref 32–36)
MCV RBC AUTO: 81.4 FL — SIGNIFICANT CHANGE UP (ref 80–100)
NRBC # BLD AUTO: 0 K/UL — SIGNIFICANT CHANGE UP (ref 0–0)
NRBC # BLD: 0 /100 WBCS — SIGNIFICANT CHANGE UP (ref 0–0)
NRBC # FLD: 0 K/UL — SIGNIFICANT CHANGE UP (ref 0–0)
NRBC BLD-RTO: 0 /100 WBCS — SIGNIFICANT CHANGE UP (ref 0–0)
PHOSPHATE SERPL-MCNC: 2.5 MG/DL — SIGNIFICANT CHANGE UP (ref 2.5–4.5)
PLATELET # BLD AUTO: 334 K/UL — SIGNIFICANT CHANGE UP (ref 150–400)
POTASSIUM SERPL-MCNC: 3.5 MMOL/L — SIGNIFICANT CHANGE UP (ref 3.5–5.3)
POTASSIUM SERPL-SCNC: 3.5 MMOL/L — SIGNIFICANT CHANGE UP (ref 3.5–5.3)
PROT SERPL-MCNC: 8.2 G/DL — SIGNIFICANT CHANGE UP (ref 6–8.3)
RBC # BLD: 4.79 M/UL — SIGNIFICANT CHANGE UP (ref 4.2–5.8)
RBC # FLD: 13.2 % — SIGNIFICANT CHANGE UP (ref 10.3–14.5)
SODIUM SERPL-SCNC: 139 MMOL/L — SIGNIFICANT CHANGE UP (ref 135–145)
WBC # BLD: 9.86 K/UL — SIGNIFICANT CHANGE UP (ref 3.8–10.5)
WBC # FLD AUTO: 9.86 K/UL — SIGNIFICANT CHANGE UP (ref 3.8–10.5)

## 2024-09-21 RX ORDER — FENOFIBRATE 160 MG/1
1 TABLET ORAL
Qty: 0 | Refills: 0 | DISCHARGE

## 2024-09-21 RX ORDER — LOVASTATIN 20 MG/1
1 TABLET ORAL
Refills: 0 | DISCHARGE

## 2024-09-21 RX ORDER — ACETAMINOPHEN 500 MG/5ML
975 LIQUID (ML) ORAL EVERY 6 HOURS
Refills: 0 | Status: DISCONTINUED | OUTPATIENT
Start: 2024-09-21 | End: 2024-09-21

## 2024-09-21 RX ORDER — IBUPROFEN 200 MG
600 TABLET ORAL EVERY 6 HOURS
Refills: 0 | Status: DISCONTINUED | OUTPATIENT
Start: 2024-09-21 | End: 2024-09-21

## 2024-09-21 RX ORDER — ATORVASTATIN CALCIUM 80 MG/1
1 TABLET, FILM COATED ORAL
Qty: 0 | Refills: 0 | DISCHARGE

## 2024-09-21 RX ADMIN — HEPARIN SODIUM 5000 UNIT(S): 1000 INJECTION INTRAVENOUS; SUBCUTANEOUS at 05:22

## 2024-09-21 RX ADMIN — Medication 1000 MILLIGRAM(S): at 00:00

## 2024-09-21 RX ADMIN — LISINOPRIL 10 MILLIGRAM(S): 5 TABLET ORAL at 05:22

## 2024-09-21 RX ADMIN — Medication 40 MILLIGRAM(S): at 05:22

## 2024-09-21 RX ADMIN — Medication 975 MILLIGRAM(S): at 12:53

## 2024-09-21 RX ADMIN — Medication 25 GRAM(S): at 05:23

## 2024-09-24 PROBLEM — Z78.9 OTHER SPECIFIED HEALTH STATUS: Chronic | Status: INACTIVE | Noted: 2022-08-16 | Resolved: 2024-09-20

## 2024-09-24 RX ORDER — LOVASTATIN 20 MG/1
1 TABLET ORAL
Refills: 0 | DISCHARGE

## 2024-09-24 RX ORDER — METFORMIN HYDROCHLORIDE 850 MG/1
1 TABLET, FILM COATED ORAL
Refills: 0 | DISCHARGE

## 2024-09-24 RX ORDER — ASPIRIN 81 MG
1 TABLET, DELAYED RELEASE (ENTERIC COATED) ORAL
Refills: 0 | DISCHARGE

## 2024-09-24 RX ORDER — FENOFIBRATE 145 MG/1
1 TABLET, FILM COATED ORAL
Refills: 0 | DISCHARGE

## 2024-09-24 RX ORDER — PANTOPRAZOLE SODIUM 40 MG
1 TABLET, DELAYED RELEASE (ENTERIC COATED) ORAL
Refills: 0 | DISCHARGE

## 2024-09-24 RX ORDER — LISINOPRIL/HYDROCHLOROTHIAZIDE 20 MG-25MG
1 TABLET ORAL
Refills: 0 | DISCHARGE

## 2024-09-25 LAB
CULTURE RESULTS: SIGNIFICANT CHANGE UP
CULTURE RESULTS: SIGNIFICANT CHANGE UP
SPECIMEN SOURCE: SIGNIFICANT CHANGE UP
SPECIMEN SOURCE: SIGNIFICANT CHANGE UP
SURGICAL PATHOLOGY STUDY: SIGNIFICANT CHANGE UP

## 2024-09-27 PROBLEM — E78.5 HYPERLIPIDEMIA, UNSPECIFIED: Chronic | Status: ACTIVE | Noted: 2024-08-31

## 2024-09-27 PROBLEM — E11.9 TYPE 2 DIABETES MELLITUS WITHOUT COMPLICATIONS: Chronic | Status: ACTIVE | Noted: 2024-08-31

## 2024-09-27 PROBLEM — I10 ESSENTIAL (PRIMARY) HYPERTENSION: Chronic | Status: ACTIVE | Noted: 2024-08-31

## 2024-10-01 ENCOUNTER — APPOINTMENT (OUTPATIENT)
Dept: TRAUMA SURGERY | Facility: HOSPITAL | Age: 49
End: 2024-10-01

## 2024-12-02 NOTE — ED PROVIDER NOTE - NS_EDPROVIDERDISPOUSERTYPE_ED_A_ED
Problem: Risk for Elopement  Goal: Patient will not exit the unit/facility without proper excort  Outcome: Progressing  Flowsheets (Taken 12/1/2024 0915 by Maggy Diaz RN)  Nursing Interventions for Elopement Risk: Assist with personal care needs such as toileting, eating, dressing, as needed to reduce the risk of wandering     Problem: Safety - Adult  Goal: Free from fall injury  Outcome: Progressing      I have personally evaluated and examined the patient. The Attending was available to me as a supervising provider if needed.
